# Patient Record
Sex: MALE | Race: WHITE | Employment: OTHER | URBAN - METROPOLITAN AREA
[De-identification: names, ages, dates, MRNs, and addresses within clinical notes are randomized per-mention and may not be internally consistent; named-entity substitution may affect disease eponyms.]

---

## 2017-01-19 ENCOUNTER — TELEPHONE (OUTPATIENT)
Dept: PULMONOLOGY | Age: 77
End: 2017-01-19

## 2017-01-19 NOTE — TELEPHONE ENCOUNTER
Cardiac Rehab called patient who has declined our services due to living in [place], [place] - states it to too far of a drive.     Thank you,  Wilbert Brizuela

## 2017-01-30 ENCOUNTER — OFFICE VISIT (OUTPATIENT)
Dept: CARDIOLOGY CLINIC | Age: 77
End: 2017-01-30

## 2017-01-30 VITALS
DIASTOLIC BLOOD PRESSURE: 72 MMHG | HEIGHT: 68 IN | BODY MASS INDEX: 25.01 KG/M2 | WEIGHT: 165 LBS | OXYGEN SATURATION: 98 % | HEART RATE: 78 BPM | SYSTOLIC BLOOD PRESSURE: 122 MMHG

## 2017-01-30 DIAGNOSIS — R73.03 PRE-DIABETES: ICD-10-CM

## 2017-01-30 DIAGNOSIS — I25.10 CORONARY ARTERY DISEASE INVOLVING NATIVE CORONARY ARTERY OF NATIVE HEART WITHOUT ANGINA PECTORIS: Primary | ICD-10-CM

## 2017-01-30 DIAGNOSIS — E78.5 DYSLIPIDEMIA: ICD-10-CM

## 2017-01-30 DIAGNOSIS — R07.9 CHEST PAIN IN ADULT: ICD-10-CM

## 2017-01-30 DIAGNOSIS — I10 ESSENTIAL HYPERTENSION: ICD-10-CM

## 2017-01-30 RX ORDER — AMPICILLIN TRIHYDRATE 250 MG
600 CAPSULE ORAL DAILY
COMMUNITY

## 2017-01-30 RX ORDER — METOPROLOL SUCCINATE 50 MG/1
50 TABLET, EXTENDED RELEASE ORAL DAILY
Qty: 90 TAB | Refills: 2 | Status: SHIPPED | OUTPATIENT
Start: 2017-01-30 | End: 2018-02-27 | Stop reason: SDUPTHER

## 2017-01-30 RX ORDER — NITROGLYCERIN 0.4 MG/1
0.4 TABLET SUBLINGUAL
Qty: 25 TAB | Refills: 3 | Status: SHIPPED | OUTPATIENT
Start: 2017-01-30

## 2017-01-30 NOTE — PROGRESS NOTES
1. Have you been to the ER, urgent care clinic since your last visit? Hospitalized since your last visit? Yes, [address] 12/29/16    2. Have you seen or consulted any other health care providers outside of the [address] [place] [address] since your last visit? Include any pap smears or colon screening.  No

## 2017-01-30 NOTE — PROGRESS NOTES
Donna Solo presents today for post hospital follow-up. He is a 81-year-old male with known CAD (status post 4 stents at [facility] about 11 years ago), hypertension, dyslipidemia, prediabetes. He has been lost to cardiology follow-up since stents were put in 11 years ago but he states he has been following up with his primary care physician. He presented to the hospital with complaints of exertional chest pain that had been occurring for several months prior to presentation and had worsened a few days prior to presenting to the ER. At that point, he was experiencing chest pain with minimal exertion. He works as a  for [employer].    His initial troponin was 0.20 and it peaked to 0.49. His hemoglobin A1c was 5.9. His chest x-ray was clear and showed no cardiopulmonary process. He underwent cardiac catheterization on 12/28/2016. His EF was estimated to be 55%. The LAD was noted to have an ostial 30%, distal segment of the proximal stent had a 90% in-stent restenosis, and diffuse native 30-40% disease distally. The right coronary artery had a long proximal diffuse 90% with proximal stent segment in-stent restenosis 95%. He then underwent successful stenting of the long-arm right coronary artery 90-95% to 0% as well as focal LAD in-stent restenosis 90% to 0%. He was discharged home on ACE inhibitor, beta-blocker, aspirin, statin, and Brilinta. He declined cardiac rehab as he lives in [place], [place] and it was too far to drive back and forth. He denies chest pain, tightness, heaviness, and palpitations. He denies shortness of breath at rest, dyspnea on exertion, orthopnea and PND. He denies abdominal bloating. He denies lightheadedness, admits to dizziness with sudden position changes (not new), and denies syncope. He denies lower extremity edema and claudication. Denies nausea, vomiting, diarrhea, fever, chills.        PMH:  Past Medical History Diagnosis Date    CAD (coronary artery disease)     Essential hypertension     Hyperlipidemia          PSH:  Past Surgical History   Procedure Laterality Date    Cardiac catheterization  12/28/2016          Coronary artery angiogram  12/28/2016          Lv angiography  12/28/2016          Coronary stent single w/ptca  12/28/2016          Coronary stent ea addl vessel  12/28/2016             MEDS:  Current Outpatient Prescriptions   Medication Sig    red yeast rice extract 600 mg cap Take 600 mg by mouth now.  UBIDECARENONE/VITAMIN E MIXED (COQ10  PO) Take  by mouth.  metoprolol succinate (TOPROL-XL) 50 mg XL tablet Take 1 Tab by mouth daily.  nitroglycerin (NITROSTAT) 0.4 mg SL tablet 1 Tab by SubLINGual route every five (5) minutes as needed for Chest Pain.  atorvastatin (LIPITOR) 10 mg tablet Take 1 Tab by mouth nightly.  ticagrelor (BRILINTA) 90 mg tablet Take 1 Tab by mouth two (2) times a day.  ramipril (ALTACE) 10 mg capsule Take 10 mg by mouth daily.  aspirin 81 mg chewable tablet Take 81 mg by mouth daily. No current facility-administered medications for this visit. Allergies and Sensitivities:  No Known Allergies    Family History:  No family history on file. Social History:  He  reports that he has never smoked. He does not have any smokeless tobacco history on file. He  reports that he does not drink alcohol. Physical:  Visit Vitals    /72    Pulse 78    Ht 5' 8\" (1.727 m)    Wt 74.8 kg (165 lb)    SpO2 98%    BMI 25.09 kg/m2         Exam:  Neck:  Supple, no JVD, no carotid bruits  CV:  Normal S1 and  S2, no murmurs, rubs, or gallops noted  Lungs:  Clear to ausculation throughout, no wheezes or rales  Abd:  Soft, non-tender, non-distended with good bowel sounds. No hepatosplenomegaly  Extremities:  No edema      Data:  EKG:   Normal sinus rhythm, rate 78.  Complete left bundle branch block with some secondary T-wave changes. LABS:  Lab Results   Component Value Date/Time    Sodium 139 12/29/2016 05:10 AM    Potassium 3.7 12/29/2016 05:10 AM    Chloride 103 12/29/2016 05:10 AM    CO2 25 12/29/2016 05:10 AM    Glucose 111 12/29/2016 05:10 AM    BUN 17 12/29/2016 05:10 AM    Creatinine 1.18 12/29/2016 05:10 AM     Lab Results   Component Value Date/Time    Cholesterol, total 178 12/29/2016 05:10 AM    HDL Cholesterol 34 12/29/2016 05:10 AM    LDL, calculated 96.8 12/29/2016 05:10 AM    Triglyceride 236 12/29/2016 05:10 AM    CHOL/HDL Ratio 5.2 12/29/2016 05:10 AM     Lab Results   Component Value Date/Time    ALT 22 12/27/2016 02:38 PM         Impression / Plan:  1.  CAD, s/p stents ~ 11 years ago and s/p successful PCI/stents to long RCA PXS40-11% and focal LAD ISR 90%  2. Hypertension, blood pressure well-controlled  3. Pre-diabetes  4. Dyslipidemia, on atorvastatin 10mg      Mr. Liza Britton was seen today for a post-hospital follow-up. He is status post successful stenting of a long right coronary artery 90-95% in-stent restenosis as well as focal LAD in-stent restenosis 90%. Mr. Liza Britton states that he had 4 stents put in about 11 years ago at [facility] but has been lost to cardiology follow-up. However, he states that he has been following up routinely with his primary care physician. He reports that he has had stress test done since the stents were put in many years ago but we currently do not have any records of that. Since being discharged home from the hospital, he states that he has been feeling well. He denies any further episodes of exertional chest pain. He does have some mild occasional dyspnea on exertion. He states that he has had some dizziness with position changes but this has been occurring for many years and generally the dizziness only lasts a few seconds. He states that he is compliant with his medication regimen.   His blood pressure and heart rate are well controlled, his lungs are clear, and he does not exhibit any signs of volume overload at this time. No changes were made to his medication today. Medication teaching was done with him. Importance of taking Brilinta and aspirin as prescribed discussed with him. He was given a new Brilinta savings card as he states that the one that was given to him at the hospital was declined. However, he admits that he did not activate the card. He states that in the past, when he was taking a statin, he had difficulties with taking it as he would experience muscle cramping but so far since being started on the atorvastatin; he has not had any problems. He is also taking co-Q10 . Patient education material regarding CAD and heart healthy eating attached his after visit summary. Greater than 50% of a 35 minute visit was spent counseling and answering questions. Importance of routine cardiology follow-up also discussed with him and he states that he is willing to drive out here for his follow-up appointments. He has been scheduled to follow-up with Dr. Merna Rubin as scheduled and as needed.   He was instructed to call the office if he has any cardiac problems or complaints or if he has any problems with obtaining the Brilinta due to cost.      Josephine Leyva MSN, FNP-BC

## 2017-01-30 NOTE — MR AVS SNAPSHOT
Visit Information Date & Time Provider Department Dept. Phone Encounter #  
 1/30/2017  9:00 AM Ashlie Sood NP [facility] [address] [id number] Your Appointments 4/18/2017  3:40 PM  
POST HOSPITAL with Alaina Krishnan MD  
[facility] 1 ([facility]) Appt Note: PH follow-up [facility] [id number] [address] [address] [phone] [facility] [address] Upcoming Health Maintenance Date Due DTaP/Tdap/Td series (1 - Tdap) 10/16/1962 ZOSTER VACCINE AGE 60> 10/16/2001 GLAUCOMA SCREENING Q2Y 10/16/2006 Pneumococcal 65+ Low/Medium Risk (1 of 2 - PCV13) 10/16/2006 MEDICARE YEARLY EXAM 10/16/2006 INFLUENZA AGE 9 TO ADULT 8/1/2016 Allergies as of 1/30/2017  Review Complete On: 1/30/2017 By: Ashlie Sood NP No Known Allergies Current Immunizations  Never Reviewed No immunizations on file. Not reviewed this visit You Were Diagnosed With   
  
 Codes Comments Coronary artery disease involving native coronary artery of native heart without angina pectoris    -  Primary ICD-10-CM: I25.10 ICD-9-CM: 414.01 Chest pain in adult     ICD-10-CM: R07.9 ICD-9-CM: 786.50 Essential hypertension     ICD-10-CM: I10 
ICD-9-CM: 401.9 Dyslipidemia     ICD-10-CM: E78.5 ICD-9-CM: 272.4 Pre-diabetes     ICD-10-CM: R73.03 
ICD-9-CM: 790.29 Vitals BP Pulse Height(growth percentile) Weight(growth percentile) SpO2 BMI  
 122/72 78 5' 8\" (1.727 m) 165 lb (74.8 kg) 98% 25.09 kg/m2 Smoking Status Never Smoker BMI and BSA Data Body Mass Index Body Surface Area 25.09 kg/m 2 1.89 m 2 Preferred Pharmacy Pharmacy Name Phone [facility] [place] [facility] [place] [phone] Your Updated Medication List  
  
   
 This list is accurate as of: 17  9:27 AM.  Always use your most recent med list.  
  
  
  
  
 aspirin 81 mg chewable tablet Take 81 mg by mouth daily. atorvastatin 10 mg tablet Commonly known as:  LIPITOR Take 1 Tab by mouth nightly. COQ10  PO Take  by mouth.  
  
 metoprolol succinate 50 mg XL tablet Commonly known as:  TOPROL-XL Take 1 Tab by mouth daily. nitroglycerin 0.4 mg SL tablet Commonly known as:  NITROSTAT  
1 Tab by SubLINGual route every five (5) minutes as needed for Chest Pain. ramipril 10 mg capsule Commonly known as:  ALTACE Take 10 mg by mouth daily. red yeast rice extract 600 mg Cap Take 600 mg by mouth now. ticagrelor 90 mg tablet Commonly known as:  [place] Take 1 Tab by mouth two (2) times a day. Prescriptions Sent to Pharmacy Refills  
 metoprolol succinate (TOPROL-XL) 50 mg XL tablet 2 Sig: Take 1 Tab by mouth daily. Class: Normal  
 Pharmacy: [address] Ph #: [phone] Route: Oral  
 nitroglycerin (NITROSTAT) 0.4 mg SL tablet 3 Si Tab by SubLINGual route every five (5) minutes as needed for Chest Pain. Class: Normal  
 Pharmacy: [address] Ph #: [phone] Route: SubLINGual  
  
We Performed the Following AMB POC EKG ROUTINE W/ 12 LEADS, INTER & REP [52827 CPT(R)] Patient Instructions Continue present medication regimen Follow-up with Dr. Praveena Son as scheduled and as needed Heart healthy diet, low sodium diet 2000mg per day Learning About Coronary Artery Disease (CAD) What is coronary artery disease? Coronary artery disease (CAD) occurs when plaque builds up in the arteries that bring oxygen-rich blood to your heart. Plaque is a fatty substance made of cholesterol, calcium, and other substances in the blood.  This process is called hardening of the arteries, or atherosclerosis. What happens when you have coronary artery disease? · Plaque may narrow the coronary arteries. Narrowed arteries cause poor blood flow. This can lead to angina symptoms such as chest pain or discomfort. If blood flow is completely blocked, you could have a heart attack. · You can slow CAD and reduce the risk of future problems by making changes in your lifestyle. These include quitting smoking and eating heart-healthy foods. · Treatments for CAD, along with changes in your lifestyle, can help you live a longer and healthier life. How can you prevent coronary artery disease? · Do not smoke. It may be the best thing you can do to prevent heart disease. If you need help quitting, talk to your doctor about stop-smoking programs and medicines. These can increase your chances of quitting for good. · Be active. Get at least 30 minutes of exercise on most days of the week. Walking is a good choice. You also may want to do other activities, such as running, swimming, cycling, or playing tennis or team sports. · Eat heart-healthy foods. Eat more fruits and vegetables and less foods that contain saturated and trans fats. Limit alcohol, sodium, and sweets. · Stay at a healthy weight. Lose weight if you need to. · Manage other health problems such as diabetes, high blood pressure, and high cholesterol. · Manage stress. Stress can hurt your heart. To keep stress low, talk about your problems and feelings. Don't keep your feelings hidden. · If you have talked about it with your doctor, take a low-dose aspirin every day. Aspirin can help certain people lower their risk of a heart attack or stroke. But taking aspirin isn't right for everyone, because it can cause serious bleeding. Do not start taking daily aspirin unless your doctor knows about it. How is coronary artery disease treated? · Your doctor will suggest that you make lifestyle changes. For example, your doctor may ask you to eat healthy foods, quit smoking, lose extra weight, and be more active. · You will have to take medicines. · Your doctor may suggest a procedure to open narrowed or blocked arteries. This is called angioplasty. Or your doctor may suggest using healthy blood vessels to create detours around narrowed or blocked arteries. This is called bypass surgery. Follow-up care is a key part of your treatment and safety. Be sure to make and go to all appointments, and call your doctor if you are having problems. It's also a good idea to know your test results and keep a list of the medicines you take. Where can you learn more? Go to http://vi-edwar.info/. Enter (44) [phone] in the search box to learn more about \"Learning About Coronary Artery Disease (CAD). \" Current as of: January 27, 2016 Content Version: 11.1 © 7659-7178 [employer]. Care instructions adapted under license by [employer] (which disclaims liability or warranty for this information). If you have questions about a medical condition or this instruction, always ask your healthcare professional. [address] any warranty or liability for your use of this information. Heart-Healthy Diet: Care Instructions Your Care Instructions A heart-healthy diet has lots of vegetables, fruits, nuts, beans, and whole grains, and is low in salt. It limits foods that are high in saturated fat, such as meats, cheeses, and fried foods. It may be hard to change your diet, but even small changes can lower your risk of heart attack and heart disease. Follow-up care is a key part of your treatment and safety. Be sure to make and go to all appointments, and call your doctor if you are having problems. It's also a good idea to know your test results and keep a list of the medicines you take. How can you care for yourself at home? Watch your portions · Learn what a serving is. A \"serving\" and a \"portion\" are not always the same thing. Make sure that you are not eating larger portions than are recommended. For example, a serving of pasta is ½ cup. A serving size of meat is 2 to 3 ounces. A 3-ounce serving is about the size of a deck of cards. Measure serving sizes until you are good at [place]" them. Keep in mind that restaurants often serve portions that are 2 or 3 times the size of one serving. · To keep your energy level up and keep you from feeling hungry, eat often but in smaller portions. · Eat only the number of calories you need to stay at a healthy weight. If you need to lose weight, eat fewer calories than your body burns (through exercise and other physical activity). Eat more fruits and vegetables · Eat a variety of fruit and vegetables every day. Dark green, deep orange, red, or yellow fruits and vegetables are especially good for you. Examples include spinach, carrots, peaches, and berries. · Keep carrots, celery, and other veggies handy for snacks. Buy fruit that is in season and store it where you can see it so that you will be tempted to eat it. · Cook dishes that have a lot of veggies in them, such as stir-fries and soups. Limit saturated and trans fat · Read food labels, and try to avoid saturated and trans fats. They increase your risk of heart disease. Trans fat is found in many processed foods such as cookies and crackers. · Use olive or canola oil when you cook. Try cholesterol-lowering spreads, such as Benecol or Take Control. · Bake, broil, grill, or steam foods instead of frying them. · Choose lean meats instead of high-fat meats such as hot dogs and sausages. Cut off all visible fat when you prepare meat. · Eat fish, skinless poultry, and meat alternatives such as soy products instead of high-fat meats.  Soy products, such as tofu, may be especially good for your heart. · Choose low-fat or fat-free milk and dairy products. Eat fish · Eat at least two servings of fish a week. Certain fish, such as salmon and tuna, contain omega-3 fatty acids, which may help reduce your risk of heart attack. Eat foods high in fiber · Eat a variety of grain products every day. Include whole-grain foods that have lots of fiber and nutrients. Examples of whole-grain foods include oats, whole wheat bread, and brown rice. · Buy whole-grain breads and cereals, instead of white bread or pastries. Limit salt and sodium · Limit how much salt and sodium you eat to help lower your blood pressure. · Taste food before you salt it. Add only a little salt when you think you need it. With time, your taste buds will adjust to less salt. · Eat fewer snack items, fast foods, and other high-salt, processed foods. Check food labels for the amount of sodium in packaged foods. · Choose low-sodium versions of canned goods (such as soups, vegetables, and beans). Limit sugar · Limit drinks and foods with added sugar. These include candy, desserts, and soda pop. Limit alcohol · Limit alcohol to no more than 2 drinks a day for men and 1 drink a day for women. Too much alcohol can cause health problems. When should you call for help? Watch closely for changes in your health, and be sure to contact your doctor if: 
· You would like help planning heart-healthy meals. Where can you learn more? Go to http://vi-edwar.info/. Enter V137 in the search box to learn more about \"Heart-Healthy Diet: Care Instructions. \" Current as of: January 27, 2016 Content Version: 11.1 © 1389-0670 [employer]. Care instructions adapted under license by [employer] (which disclaims liability or warranty for this information).  If you have questions about a medical condition or this instruction, always ask your healthcare professional. [employer] disclaims any warranty or liability for your use of this information. Introducing [facility]! [facility] introduces Scaleogy patient portal. Now you can access parts of your medical record, email your doctor's office, and request medication refills online. 1. In your internet browser, go to [email or web address] 2. Click on the First Time User? Click Here link in the Sign In box. You will see the New Member Sign Up page. 3. Enter your Scaleogy Access Code exactly as it appears below. You will not need to use this code after youve completed the sign-up process. If you do not sign up before the expiration date, you must request a new code. · Scaleogy Access Code: [email or web address] Expires: 3/27/2017  3:29 PM 
 
4. Enter the last four digits of your Social Security Number (xxxx) and Date of Birth (mm/dd/yyyy) as indicated and click Submit. You will be taken to the next sign-up page. 5. Create a Scaleogy ID. This will be your Scaleogy login ID and cannot be changed, so think of one that is secure and easy to remember. 6. Create a Scaleogy password. You can change your password at any time. 7. Enter your Password Reset Question and Answer. This can be used at a later time if you forget your password. 8. Enter your e-mail address. You will receive e-mail notification when new information is available in [address] 9. Click Sign Up. You can now view and download portions of your medical record. 10. Click the Download Summary menu link to download a portable copy of your medical information. If you have questions, please visit the Frequently Asked Questions section of the Scaleogy website. Remember, Scaleogy is NOT to be used for urgent needs. For medical emergencies, dial 911. Now available from your iPhone and Android! Please provide this summary of care documentation to your next provider. Your primary care clinician is listed as Luis E Rodriguez. If you have any questions after today's visit, please call [phone].

## 2017-01-30 NOTE — PATIENT INSTRUCTIONS
Continue present medication regimen  Follow-up with Dr. Freddie Rabago as scheduled and as needed  Heart healthy diet, low sodium diet 2000mg per day       Learning About Coronary Artery Disease (CAD)  What is coronary artery disease? Coronary artery disease (CAD) occurs when plaque builds up in the arteries that bring oxygen-rich blood to your heart. Plaque is a fatty substance made of cholesterol, calcium, and other substances in the blood. This process is called hardening of the arteries, or atherosclerosis. What happens when you have coronary artery disease? · Plaque may narrow the coronary arteries. Narrowed arteries cause poor blood flow. This can lead to angina symptoms such as chest pain or discomfort. If blood flow is completely blocked, you could have a heart attack. · You can slow CAD and reduce the risk of future problems by making changes in your lifestyle. These include quitting smoking and eating heart-healthy foods. · Treatments for CAD, along with changes in your lifestyle, can help you live a longer and healthier life. How can you prevent coronary artery disease? · Do not smoke. It may be the best thing you can do to prevent heart disease. If you need help quitting, talk to your doctor about stop-smoking programs and medicines. These can increase your chances of quitting for good. · Be active. Get at least 30 minutes of exercise on most days of the week. Walking is a good choice. You also may want to do other activities, such as running, swimming, cycling, or playing tennis or team sports. · Eat heart-healthy foods. Eat more fruits and vegetables and less foods that contain saturated and trans fats. Limit alcohol, sodium, and sweets. · Stay at a healthy weight. Lose weight if you need to. · Manage other health problems such as diabetes, high blood pressure, and high cholesterol. · Manage stress. Stress can hurt your heart. To keep stress low, talk about your problems and feelings.  Don't keep your feelings hidden. · If you have talked about it with your doctor, take a low-dose aspirin every day. Aspirin can help certain people lower their risk of a heart attack or stroke. But taking aspirin isn't right for everyone, because it can cause serious bleeding. Do not start taking daily aspirin unless your doctor knows about it. How is coronary artery disease treated? · Your doctor will suggest that you make lifestyle changes. For example, your doctor may ask you to eat healthy foods, quit smoking, lose extra weight, and be more active. · You will have to take medicines. · Your doctor may suggest a procedure to open narrowed or blocked arteries. This is called angioplasty. Or your doctor may suggest using healthy blood vessels to create detours around narrowed or blocked arteries. This is called bypass surgery. Follow-up care is a key part of your treatment and safety. Be sure to make and go to all appointments, and call your doctor if you are having problems. It's also a good idea to know your test results and keep a list of the medicines you take. Where can you learn more? Go to http://vi-edwar.info/. Enter [phone] [phone] in the search box to learn more about \"Learning About Coronary Artery Disease (CAD). \"  Current as of: January 27, 2016  Content Version: 11.1  © 2348-9338 [employer]. Care instructions adapted under license by [employer] (which disclaims liability or warranty for this information). If you have questions about a medical condition or this instruction, always ask your healthcare professional. [place] [address] any warranty or liability for your use of this information. Heart-Healthy Diet: Care Instructions  Your Care Instructions    A heart-healthy diet has lots of vegetables, fruits, nuts, beans, and whole grains, and is low in salt. It limits foods that are high in saturated fat, such as meats, cheeses, and fried foods.  It may be hard to change your diet, but even small changes can lower your risk of heart attack and heart disease. Follow-up care is a key part of your treatment and safety. Be sure to make and go to all appointments, and call your doctor if you are having problems. It's also a good idea to know your test results and keep a list of the medicines you take. How can you care for yourself at home? Watch your portions  · Learn what a serving is. A \"serving\" and a \"portion\" are not always the same thing. Make sure that you are not eating larger portions than are recommended. For example, a serving of pasta is ½ cup. A serving size of meat is 2 to 3 ounces. A 3-ounce serving is about the size of a deck of cards. Measure serving sizes until you are good at [place]" them. Keep in mind that restaurants often serve portions that are 2 or 3 times the size of one serving. · To keep your energy level up and keep you from feeling hungry, eat often but in smaller portions. · Eat only the number of calories you need to stay at a healthy weight. If you need to lose weight, eat fewer calories than your body burns (through exercise and other physical activity). Eat more fruits and vegetables  · Eat a variety of fruit and vegetables every day. Dark green, deep orange, red, or yellow fruits and vegetables are especially good for you. Examples include spinach, carrots, peaches, and berries. · Keep carrots, celery, and other veggies handy for snacks. Buy fruit that is in season and store it where you can see it so that you will be tempted to eat it. · Cook dishes that have a lot of veggies in them, such as stir-fries and soups. Limit saturated and trans fat  · Read food labels, and try to avoid saturated and trans fats. They increase your risk of heart disease. Trans fat is found in many processed foods such as cookies and crackers. · Use olive or canola oil when you cook.  Try cholesterol-lowering spreads, such as Benecol or Take Control. · Bake, broil, grill, or steam foods instead of frying them. · Choose lean meats instead of high-fat meats such as hot dogs and sausages. Cut off all visible fat when you prepare meat. · Eat fish, skinless poultry, and meat alternatives such as soy products instead of high-fat meats. Soy products, such as tofu, may be especially good for your heart. · Choose low-fat or fat-free milk and dairy products. Eat fish  · Eat at least two servings of fish a week. Certain fish, such as salmon and tuna, contain omega-3 fatty acids, which may help reduce your risk of heart attack. Eat foods high in fiber  · Eat a variety of grain products every day. Include whole-grain foods that have lots of fiber and nutrients. Examples of whole-grain foods include oats, whole wheat bread, and brown rice. · Buy whole-grain breads and cereals, instead of white bread or pastries. Limit salt and sodium  · Limit how much salt and sodium you eat to help lower your blood pressure. · Taste food before you salt it. Add only a little salt when you think you need it. With time, your taste buds will adjust to less salt. · Eat fewer snack items, fast foods, and other high-salt, processed foods. Check food labels for the amount of sodium in packaged foods. · Choose low-sodium versions of canned goods (such as soups, vegetables, and beans). Limit sugar  · Limit drinks and foods with added sugar. These include candy, desserts, and soda pop. Limit alcohol  · Limit alcohol to no more than 2 drinks a day for men and 1 drink a day for women. Too much alcohol can cause health problems. When should you call for help? Watch closely for changes in your health, and be sure to contact your doctor if:  · You would like help planning heart-healthy meals. Where can you learn more? Go to http://vi-edwar.info/. Enter V137 in the search box to learn more about \"Heart-Healthy Diet: Care Instructions. \"  Current as of: January 27, 2016  Content Version: 11.1  © 8654-2874 [employer]. Care instructions adapted under license by [employer] (which disclaims liability or warranty for this information). If you have questions about a medical condition or this instruction, always ask your healthcare professional. [place] [address] any warranty or liability for your use of this information.

## 2017-04-18 ENCOUNTER — OFFICE VISIT (OUTPATIENT)
Dept: CARDIOLOGY CLINIC | Age: 77
End: 2017-04-18

## 2017-04-18 VITALS
SYSTOLIC BLOOD PRESSURE: 140 MMHG | HEART RATE: 69 BPM | OXYGEN SATURATION: 98 % | HEIGHT: 68 IN | DIASTOLIC BLOOD PRESSURE: 84 MMHG | BODY MASS INDEX: 26.22 KG/M2 | WEIGHT: 173 LBS

## 2017-04-18 DIAGNOSIS — E78.5 DYSLIPIDEMIA: ICD-10-CM

## 2017-04-18 DIAGNOSIS — I25.10 CORONARY ARTERY DISEASE INVOLVING NATIVE CORONARY ARTERY OF NATIVE HEART WITHOUT ANGINA PECTORIS: Primary | ICD-10-CM

## 2017-04-18 DIAGNOSIS — I24.9 ACS (ACUTE CORONARY SYNDROME) (HCC): ICD-10-CM

## 2017-04-18 DIAGNOSIS — I10 ESSENTIAL HYPERTENSION: ICD-10-CM

## 2017-04-18 RX ORDER — CLOPIDOGREL BISULFATE 75 MG/1
75 TABLET ORAL DAILY
Qty: 30 TAB | Refills: 6 | Status: SHIPPED | OUTPATIENT
Start: 2017-04-18 | End: 2017-12-19 | Stop reason: SDUPTHER

## 2017-04-18 NOTE — MR AVS SNAPSHOT
Visit Information Date & Time Provider Department Dept. Phone Encounter #  
 4/18/2017  2:00 PM Bárbara Dewey MD [facility] [address] [id number] Upcoming Health Maintenance Date Due DTaP/Tdap/Td series (1 - Tdap) 10/16/1962 ZOSTER VACCINE AGE 60> 10/16/2001 GLAUCOMA SCREENING Q2Y 10/16/2006 Pneumococcal 65+ Low/Medium Risk (1 of 2 - PCV13) 10/16/2006 MEDICARE YEARLY EXAM 10/16/2006 INFLUENZA AGE 9 TO ADULT 8/1/2016 Allergies as of 4/18/2017  Review Complete On: 1/30/2017 By: Sarah Webster NP No Known Allergies Current Immunizations  Never Reviewed No immunizations on file. Not reviewed this visit You Were Diagnosed With   
  
 Codes Comments [facility]    -  Primary ICD-10-CM: I24.9 ICD-9-CM: 411.1 Vitals BP Pulse Height(growth percentile) Weight(growth percentile) SpO2 BMI  
 140/84 69 5' 8\" (1.727 m) 173 lb (78.5 kg) 98% 26.3 kg/m2 Smoking Status Never Smoker Vitals History BMI and BSA Data Body Mass Index Body Surface Area  
 26.3 kg/m 2 1.94 m 2 Preferred Pharmacy Pharmacy Name Phone [facility] [address] [facility] [phone] Your Updated Medication List  
  
   
This list is accurate as of: 4/18/17  2:20 PM.  Always use your most recent med list.  
  
  
  
  
 aspirin 81 mg chewable tablet Take 81 mg by mouth daily. atorvastatin 10 mg tablet Commonly known as:  LIPITOR Take 1 Tab by mouth nightly. COQ10  PO Take  by mouth.  
  
 metoprolol succinate 50 mg XL tablet Commonly known as:  TOPROL-XL Take 1 Tab by mouth daily. nitroglycerin 0.4 mg SL tablet Commonly known as:  NITROSTAT  
1 Tab by SubLINGual route every five (5) minutes as needed for Chest Pain. ramipril 10 mg capsule Commonly known as:  ALTACE Take 10 mg by mouth daily. red yeast rice extract 600 mg Cap Take 600 mg by mouth now. ticagrelor 90 mg tablet Commonly known as:  [employer] Take 1 Tab by mouth two (2) times a day. We Performed the Following AMB POC EKG ROUTINE W/ 12 LEADS, INTER & REP [04339 CPT(R)] Patient Instructions Stop Brilinta Start Plavix 75 mg one tab daily Introducing [facility]! [facility] introduces Face++ patient portal. Now you can access parts of your medical record, email your doctor's office, and request medication refills online. 1. In your internet browser, go to [email or web address] 2. Click on the First Time User? Click Here link in the Sign In box. You will see the New Member Sign Up page. 3. Enter your Face++ Access Code exactly as it appears below. You will not need to use this code after youve completed the sign-up process. If you do not sign up before the expiration date, you must request a new code. · Face++ Access Code: [email or web address] Expires: 7/17/2017  1:49 PM 
 
4. Enter the last four digits of your Social Security Number (xxxx) and Date of Birth (mm/dd/yyyy) as indicated and click Submit. You will be taken to the next sign-up page. 5. Create a Face++ ID. This will be your Face++ login ID and cannot be changed, so think of one that is secure and easy to remember. 6. Create a Face++ password. You can change your password at any time. 7. Enter your Password Reset Question and Answer. This can be used at a later time if you forget your password. 8. Enter your e-mail address. You will receive e-mail notification when new information is available in [address] 9. Click Sign Up. You can now view and download portions of your medical record. 10. Click the Download Summary menu link to download a portable copy of your medical information.  
 
If you have questions, please visit the Frequently Asked Questions section of the [employer]. Remember, [employer] is NOT to be used for urgent needs. For medical emergencies, dial 911. Now available from your iPhone and Android! Please provide this summary of care documentation to your next provider. Your primary care clinician is listed as Ephraim Rodriguez. If you have any questions after today's visit, please call [phone].

## 2017-04-18 NOTE — PROGRESS NOTES
HISTORY OF PRESENT ILLNESS  Michelle Holloway is a 77 y.o. male. ASSESSMENT and PLAN    Mr. Michelle Holloway has history of CAD. Around 2005, he had 4 stents placed at [facility].  In December 2016, he presented to [facility] with increased exertional chest pains for several months. He was diagnosed with ACS. He subsequently underwent angioplasty and stent of proximal RCA 90% ISR residual 0% using DERIAN. He also had focal LAD 90% I SR stented to 0%. He had been on Brilinta. However, this is switched to Plavix as he has had frequent epistaxis. He has also noticed some blood on the toilet paper. I will defer further evaluation of rectal blood to the PCP. I did switch his Brilinta to Plavix today. Hopefully, this will improve his nosebleeds. From cardiac standpoint, otherwise, he has been doing well. He remains quite active physically. He still feels that he is little more fatigued than his baseline. He denies any recurrent chest pains. He denies active tobacco use. He remains on Lipitor 10 mg daily. His weight today is 173 pounds. I will see him back in 6 months. Thank you. Encounter Diagnoses   Name Primary?  Coronary artery disease involving native coronary artery of native heart without angina pectoris Yes    [facility] (acute [facility] syndrome) ([facility]     Essential hypertension     Dyslipidemia      current treatment plan is effective, no change in therapy  lab results and schedule of future lab studies reviewed with patient  reviewed diet, exercise and weight control  cardiovascular risk and specific lipid/LDL goals reviewed  use of aspirin to prevent MI and TIA's discussed      HPI   Today, Mr. José Manuel Eubanks has no complaints of chest pains, increased shortness of breath or decreased exercise capacity. However, he has noted increased fatigue since his hospitalization in December 2016. He has not had any recurrent exertional chest pains.   He has noted increased nosebleeds and blood on the toilet paper. Therefore, he has been cutting his Brilinta tablets in half for the last 1 week. Review of Systems   Constitutional: Positive for malaise/fatigue. Respiratory: Positive for shortness of breath. Cardiovascular: Negative for chest pain, palpitations, orthopnea, claudication, leg swelling and PND. All other systems reviewed and are negative. Physical Exam   Constitutional: He is oriented to person, place, and time. He appears well-developed and well-nourished. HENT:   Head: Normocephalic. Eyes: Conjunctivae are normal.   Neck: Neck supple. No JVD present. Carotid bruit is not present. No thyromegaly present. Cardiovascular: Normal rate and regular rhythm. Pulmonary/Chest: Breath sounds normal.   Abdominal: Bowel sounds are normal.   Musculoskeletal: He exhibits no edema. Neurological: He is alert and oriented to person, place, and time. Skin: Skin is warm and dry. Nursing note and vitals reviewed. PCP: Emilia Vazquez. Hima Rogers MD    Past Medical History:   Diagnosis Date    CAD (coronary artery disease)     Essential hypertension     Hyperlipidemia        Past Surgical History:   Procedure Laterality Date    CARDIAC CATHETERIZATION  12/28/2016         CORONARY ARTERY ANGIOGRAM  12/28/2016         CORONARY STENT EA ADDL VESSEL  12/28/2016         CORONARY STENT SINGLE W/PTCA  12/28/2016         HX CORONARY STENT PLACEMENT      HX HEART CATHETERIZATION      LV ANGIOGRAPHY  12/28/2016            Current Outpatient Prescriptions   Medication Sig Dispense Refill    clopidogrel (PLAVIX) 75 mg tab Take 1 Tab by mouth daily. 30 Tab 6    red yeast rice extract 600 mg cap Take 600 mg by mouth now.  UBIDECARENONE/VITAMIN E MIXED (COQ10  PO) Take  by mouth.  metoprolol succinate (TOPROL-XL) 50 mg XL tablet Take 1 Tab by mouth daily.  90 Tab 2    nitroglycerin (NITROSTAT) 0.4 mg SL tablet 1 Tab by SubLINGual route every five (5) minutes as needed for Chest Pain. 25 Tab 3    atorvastatin (LIPITOR) 10 mg tablet Take 1 Tab by mouth nightly. 30 Tab 2    ticagrelor (BRILINTA) 90 mg tablet Take 1 Tab by mouth two (2) times a day. 60 Tab 0    ramipril (ALTACE) 10 mg capsule Take 10 mg by mouth daily.  aspirin 81 mg chewable tablet Take 81 mg by mouth daily. The patient has a family history of    Social History   Substance Use Topics    Smoking status: Never Smoker    Smokeless tobacco: None    Alcohol use No       Lab Results   Component Value Date/Time    Cholesterol, total 178 12/29/2016 05:10 AM    HDL Cholesterol 34 12/29/2016 05:10 AM    LDL, calculated 96.8 12/29/2016 05:10 AM    Triglyceride 236 12/29/2016 05:10 AM    CHOL/HDL Ratio 5.2 12/29/2016 05:10 AM        BP Readings from Last 3 Encounters:   04/18/17 140/84   01/30/17 122/72   12/29/16 142/87        Pulse Readings from Last 3 Encounters:   04/18/17 69   01/30/17 78   12/29/16 87       Wt Readings from Last 3 Encounters:   04/18/17 78.5 kg (173 lb)   01/30/17 74.8 kg (165 lb)   12/27/16 74.3 kg (163 lb 12.8 oz)         EKG: unchanged from previous tracings, normal sinus rhythm, LBBB.

## 2017-04-18 NOTE — PROGRESS NOTES
1. Have you been to the ER, urgent care clinic since your last visit? Hospitalized since your last visit? Yes, 12/27/16 - 12/29/16 for ACS    2. Have you seen or consulted any other health care providers outside of the [address] [place] [address] since your last visit? Include any pap smears or colon screening.   No

## 2017-10-17 ENCOUNTER — OFFICE VISIT (OUTPATIENT)
Dept: CARDIOLOGY CLINIC | Age: 77
End: 2017-10-17

## 2017-10-17 VITALS
OXYGEN SATURATION: 98 % | HEART RATE: 72 BPM | HEIGHT: 68 IN | SYSTOLIC BLOOD PRESSURE: 128 MMHG | BODY MASS INDEX: 26.07 KG/M2 | WEIGHT: 172 LBS | DIASTOLIC BLOOD PRESSURE: 72 MMHG

## 2017-10-17 DIAGNOSIS — E78.5 DYSLIPIDEMIA: ICD-10-CM

## 2017-10-17 DIAGNOSIS — I25.10 CORONARY ARTERY DISEASE INVOLVING NATIVE CORONARY ARTERY OF NATIVE HEART WITHOUT ANGINA PECTORIS: Primary | ICD-10-CM

## 2017-10-17 DIAGNOSIS — I10 ESSENTIAL HYPERTENSION: ICD-10-CM

## 2017-10-17 RX ORDER — METFORMIN HYDROCHLORIDE 500 MG/1
250 TABLET ORAL
COMMUNITY
End: 2020-09-10

## 2017-10-17 NOTE — MR AVS SNAPSHOT
Visit Information Date & Time Provider Department Dept. Phone Encounter #  
 10/17/2017  1:00 PM Kwesi Ramirez MD [facility] [address] [id number] Your Appointments 5/8/2018  8:20 AM  
Follow Up with Kwesi Ramirez MD  
[facility] ([facility]) Appt Note: 6-9 month f/up [place] [address] [address] [address] [phone] [address] [place] [address] [address] Upcoming Health Maintenance Date Due DTaP/Tdap/Td series (1 - Tdap) 10/16/1962 ZOSTER VACCINE AGE 60> 8/16/2001 GLAUCOMA SCREENING Q2Y 10/16/2006 Pneumococcal 65+ Low/Medium Risk (1 of 2 - PCV13) 10/16/2006 MEDICARE YEARLY EXAM 10/16/2006 INFLUENZA AGE 9 TO ADULT 8/1/2017 Allergies as of 10/17/2017  Review Complete On: 4/18/2017 By: Kwesi Ramirez MD  
 No Known Allergies Current Immunizations  Never Reviewed No immunizations on file. Not reviewed this visit You Were Diagnosed With   
  
 Codes Comments Coronary artery disease involving native coronary artery of native heart without angina pectoris    -  Primary ICD-10-CM: I25.10 ICD-9-CM: 414.01 Essential hypertension     ICD-10-CM: I10 
ICD-9-CM: 401.9 Dyslipidemia     ICD-10-CM: E78.5 ICD-9-CM: 272.4 Vitals BP Pulse Height(growth percentile) Weight(growth percentile) SpO2 BMI  
 128/72 72 5' 8\" (1.727 m) 172 lb (78 kg) 98% 26.15 kg/m2 Smoking Status Never Smoker Vitals History BMI and BSA Data Body Mass Index Body Surface Area  
 26.15 kg/m 2 1.93 m 2 Preferred Pharmacy Pharmacy Name Phone [facility] [address], [address] [place] [phone] Your Updated Medication List  
  
   
This list is accurate as of: 10/17/17  1:27 PM.  Always use your most recent med list.  
  
  
  
  
 aspirin 81 mg chewable tablet Take 81 mg by mouth daily. atorvastatin 10 mg tablet Commonly known as:  LIPITOR Take 1 Tab by mouth nightly. clopidogrel 75 mg Tab Commonly known as:  PLAVIX Take 1 Tab by mouth daily. COQ10  PO Take  by mouth.  
  
 metFORMIN 500 mg tablet Commonly known as:  GLUCOPHAGE Take 250 mg by mouth daily (with breakfast). metoprolol succinate 50 mg XL tablet Commonly known as:  TOPROL-XL Take 1 Tab by mouth daily. nitroglycerin 0.4 mg SL tablet Commonly known as:  NITROSTAT  
1 Tab by SubLINGual route every five (5) minutes as needed for Chest Pain. ramipril 10 mg capsule Commonly known as:  ALTACE Take 10 mg by mouth daily. red yeast rice extract 600 mg Cap Take 600 mg by mouth now. ticagrelor 90 mg tablet Commonly known as:  [employer] Take 1 Tab by mouth two (2) times a day. We Performed the Following AMB POC EKG ROUTINE W/ 12 LEADS, INTER & REP [57231 CPT(R)] Introducing [facility]! Brittaney Smith introduces [employer] Vision patient portal. Now you can access parts of your medical record, email your doctor's office, and request medication refills online. 1. In your internet browser, go to [email or web address] 2. Click on the First Time User? Click Here link in the Sign In box. You will see the New Member Sign Up page. 3. Enter your [employer] Access Code exactly as it appears below. You will not need to use this code after youve completed the sign-up process. If you do not sign up before the expiration date, you must request a new code. · [employer] Access Code: [email or web address] Expires: 1/15/2018  1:27 PM 
 
4. Enter the last four digits of your Social Security Number (xxxx) and Date of Birth (mm/dd/yyyy) as indicated and click Submit. You will be taken to the next sign-up page. 5. Create a [employer] Vision ID.  This will be your [employer] login ID and cannot be changed, so think of one that is secure and easy to remember. 6. Create a [employer] password. You can change your password at any time. 7. Enter your Password Reset Question and Answer. This can be used at a later time if you forget your password. 8. Enter your e-mail address. You will receive e-mail notification when new information is available in [address] 9. Click Sign Up. You can now view and download portions of your medical record. 10. Click the Download Summary menu link to download a portable copy of your medical information. If you have questions, please visit the Frequently Asked Questions section of the [employer] website. Remember, [employer] is NOT to be used for urgent needs. For medical emergencies, dial 911. Now available from your iPhone and Android! Please provide this summary of care documentation to your next provider. Your primary care clinician is listed as Josefina Frausto. If you have any questions after today's visit, please call [phone].

## 2017-10-17 NOTE — PROGRESS NOTES
1. Have you been to the ER, urgent care clinic since your last visit? Hospitalized since your last visit? No     2. Have you seen or consulted any other health care providers outside of the [address] [place] [address] since your last visit? Include any pap smears or colon screening.  No

## 2017-10-17 NOTE — PROGRESS NOTES
HISTORY OF PRESENT ILLNESS  Jay Pete is a 69 y.o. male. ASSESSMENT and PLAN    Mr. Jay Pete has history of CAD. Around 2005, he had 4 stents placed at [facility].  In December 2016, he presented to [facility] with increased exertional chest pains for several months. He was diagnosed with ACS. He subsequently underwent angioplasty and stent of proximal RCA 90% ISR residual 0% using DERIAN. He also had focal LAD 90% I SR stented to 0%. He had been on Brilinta. However, this is switched to Plavix as he has had frequent epistaxis. He has also noticed some blood on the toilet paper. I will defer further evaluation of rectal blood to the PCP. I did switch his Brilinta to Plavix today. Hopefully, this will improve his nosebleeds.  CAD:   Symptomatically stable.  BP:   Well-controlled.  HR:    Stable.  CHF:   Currently, there is no evidence of decompensated CHF noted.  Weight:   His weight today is 172 pounds. It is near his baseline.  Cholesterol:   Target LDL <70. He remains on Lipitor 10 mg daily.  Tobacco:   He denies active tobacco use.  Anti-platelet:   Remains on ASA, and Plavix. I will see him back in 6 months. Thank you. Encounter Diagnoses   Name Primary?  Coronary artery disease involving native coronary artery of native heart without angina pectoris Yes    Essential hypertension     Dyslipidemia      current treatment plan is effective, no change in therapy  lab results and schedule of future lab studies reviewed with patient  reviewed diet, exercise and weight control  very strongly urged to quit smoking to reduce cardiovascular risk  cardiovascular risk and specific lipid/LDL goals reviewed  use of aspirin to prevent MI and TIA's discussed      HPI  Today, Mr. Baljinder Becerra has no complaints of chest pains, increased shortness of breath or decreased exercise capacity. He feels that he is back to his baseline.   He denies any orthopnea or PND. He denies any palpitations or dizziness. Review of Systems   Respiratory: Negative for shortness of breath. Cardiovascular: Negative for chest pain, palpitations, orthopnea, claudication, leg swelling and PND. All other systems reviewed and are negative. Physical Exam   Constitutional: He is oriented to person, place, and time. He appears well-developed and well-nourished. HENT:   Head: Normocephalic. Eyes: Conjunctivae are normal.   Neck: Neck supple. No JVD present. Carotid bruit is not present. No thyromegaly present. Cardiovascular: Normal rate and regular rhythm. No murmur heard. Pulmonary/Chest: Breath sounds normal.   Abdominal: Bowel sounds are normal.   Musculoskeletal: He exhibits no edema. Neurological: He is alert and oriented to person, place, and time. Skin: Skin is warm and dry. Nursing note and vitals reviewed. PCP: Rita Clinton MD    Past Medical History:   Diagnosis Date    CAD (coronary artery disease)     Essential hypertension     Hyperlipidemia        Past Surgical History:   Procedure Laterality Date    CARDIAC CATHETERIZATION  12/28/2016         CORONARY ARTERY ANGIOGRAM  12/28/2016         CORONARY STENT EA ADDL VESSEL  12/28/2016         CORONARY STENT SINGLE W/PTCA  12/28/2016         HX CORONARY STENT PLACEMENT      HX HEART CATHETERIZATION      LV ANGIOGRAPHY  12/28/2016            Current Outpatient Prescriptions   Medication Sig Dispense Refill    metFORMIN (GLUCOPHAGE) 500 mg tablet Take 250 mg by mouth daily (with breakfast).  clopidogrel (PLAVIX) 75 mg tab Take 1 Tab by mouth daily. 30 Tab 6    red yeast rice extract 600 mg cap Take 600 mg by mouth now.  UBIDECARENONE/VITAMIN E MIXED (COQ10  PO) Take  by mouth.  metoprolol succinate (TOPROL-XL) 50 mg XL tablet Take 1 Tab by mouth daily.  90 Tab 2    nitroglycerin (NITROSTAT) 0.4 mg SL tablet 1 Tab by SubLINGual route every five (5) minutes as needed for Chest Pain. 25 Tab 3    atorvastatin (LIPITOR) 10 mg tablet Take 1 Tab by mouth nightly. 30 Tab 2    ticagrelor (BRILINTA) 90 mg tablet Take 1 Tab by mouth two (2) times a day. 60 Tab 0    ramipril (ALTACE) 10 mg capsule Take 10 mg by mouth daily.  aspirin 81 mg chewable tablet Take 81 mg by mouth daily. The patient has a family history of    Social History   Substance Use Topics    Smoking status: Never Smoker    Smokeless tobacco: None    Alcohol use No       Lab Results   Component Value Date/Time    Cholesterol, total 178 12/29/2016 05:10 AM    HDL Cholesterol 34 12/29/2016 05:10 AM    LDL, calculated 96.8 12/29/2016 05:10 AM    Triglyceride 236 12/29/2016 05:10 AM    CHOL/HDL Ratio 5.2 12/29/2016 05:10 AM        BP Readings from Last 3 Encounters:   10/17/17 128/72   04/18/17 140/84   01/30/17 122/72        Pulse Readings from Last 3 Encounters:   10/17/17 72   04/18/17 69   01/30/17 78       Wt Readings from Last 3 Encounters:   10/17/17 78 kg (172 lb)   04/18/17 78.5 kg (173 lb)   01/30/17 74.8 kg (165 lb)         EKG: unchanged from previous tracings, normal sinus rhythm, LBBB.

## 2017-12-19 RX ORDER — CLOPIDOGREL BISULFATE 75 MG/1
TABLET ORAL
Qty: 30 TAB | Refills: 6 | Status: SHIPPED | OUTPATIENT
Start: 2017-12-19 | End: 2018-08-03 | Stop reason: SDUPTHER

## 2018-02-27 RX ORDER — METOPROLOL SUCCINATE 50 MG/1
50 TABLET, EXTENDED RELEASE ORAL DAILY
Qty: 90 TAB | Refills: 3 | Status: SHIPPED | OUTPATIENT
Start: 2018-02-27 | End: 2019-04-13 | Stop reason: SDUPTHER

## 2018-07-10 ENCOUNTER — OFFICE VISIT (OUTPATIENT)
Dept: CARDIOLOGY CLINIC | Age: 78
End: 2018-07-10

## 2018-07-10 VITALS
HEIGHT: 68 IN | WEIGHT: 171 LBS | BODY MASS INDEX: 25.91 KG/M2 | SYSTOLIC BLOOD PRESSURE: 154 MMHG | HEART RATE: 70 BPM | OXYGEN SATURATION: 96 % | DIASTOLIC BLOOD PRESSURE: 70 MMHG

## 2018-07-10 DIAGNOSIS — I25.10 CORONARY ARTERY DISEASE INVOLVING NATIVE CORONARY ARTERY OF NATIVE HEART WITHOUT ANGINA PECTORIS: Primary | ICD-10-CM

## 2018-07-10 DIAGNOSIS — E78.5 DYSLIPIDEMIA: ICD-10-CM

## 2018-07-10 DIAGNOSIS — I10 ESSENTIAL HYPERTENSION: ICD-10-CM

## 2018-07-10 NOTE — MR AVS SNAPSHOT
[address] [place] [address] [address] [place] [address] 
[phone] Patient: Steven Olmos MRN: [id number] Visit Information Date & Time Provider Department Dept. Phone Encounter #  
 7/10/2018  2:40 PM Berna Gonsalves MD [facility] [id number] Your Appointments 7/10/2018  2:40 PM  
Follow Up with Berna Gonsalves MD  
[facility] ([address]) Appt Note: 6-9 month f/up; 6-9 month f/up. R/S due to work schedule; l/m to confirm appt on 7-10-18[facility] [id number]  
[phone] [address] Upcoming Health Maintenance Date Due DTaP/Tdap/Td series (1 - Tdap) 10/16/1962 ZOSTER VACCINE AGE 60> 2001 GLAUCOMA SCREENING Q2Y 10/16/2006 Pneumococcal 65+ Low/Medium Risk (1 of 2 - PCV13) 10/16/2006 MEDICARE YEARLY EXAM 3/14/2018 Influenza Age 5 to Adult 2018 Allergies as of 7/10/2018  Review Complete On: 10/17/2017 By: Berna Gonsalves MD  
 No Known Allergies Current Immunizations  Never Reviewed No immunizations on file. Not reviewed this visit You Were Diagnosed With   
  
 Codes Comments Coronary artery disease involving native coronary artery of native heart without angina pectoris    -  Primary ICD-10-CM: I25.10 ICD-9-CM: 414.01 Essential hypertension     ICD-10-CM: I10 
ICD-9-CM: 401.9 Dyslipidemia     ICD-10-CM: E78.5 ICD-9-CM: 272.4 Vitals BP Pulse Height(growth percentile) Weight(growth percentile) SpO2 BMI  
 154/70 70 5' 8\" (1.727 m) 171 lb (77.6 kg) 96% 26 kg/m2 Smoking Status Never Smoker Vitals History BMI and BSA Data Body Mass Index Body Surface Area  
 26 kg/m 2 1.93 m 2 Preferred Pharmacy Pharmacy Name Phone [address] [facility] [phone] Your Updated Medication List  
  
   
This list is accurate as of 7/10/18  2:39 PM.  Always use your most recent med list.  
  
  
  
  
 aspirin 81 mg chewable tablet Take 81 mg by mouth daily. atorvastatin 10 mg tablet Commonly known as:  LIPITOR Take 1 Tab by mouth nightly. clopidogrel 75 mg Tab Commonly known as:  PLAVIX TAKE ONE TABLET BY MOUTH ONCE DAILY  
  
 COQ10  PO Take  by mouth.  
  
 metFORMIN 500 mg tablet Commonly known as:  GLUCOPHAGE Take 250 mg by mouth daily (with breakfast). metoprolol succinate 50 mg XL tablet Commonly known as:  TOPROL-XL Take 1 Tab by mouth daily. nitroglycerin 0.4 mg SL tablet Commonly known as:  NITROSTAT  
1 Tab by SubLINGual route every five (5) minutes as needed for Chest Pain. ramipril 10 mg capsule Commonly known as:  ALTACE Take 10 mg by mouth daily. red yeast rice extract 600 mg Cap Take 600 mg by mouth now. We Performed the Following AMB POC EKG ROUTINE W/ 12 LEADS, INTER & REP [28480 CPT(R)] Introducing [facility]! [employer] introduces [employer] patient portal. Now you can access parts of your medical record, email your doctor's office, and request medication refills online. 1. In your internet browser, go to [email or web address] 2. Click on the First Time User? Click Here link in the Sign In box. You will see the New Member Sign Up page. 3. Enter your [employer] Access Code exactly as it appears below. You will not need to use this code after youve completed the sign-up process. If you do not sign up before the expiration date, you must request a new code. · [employer] Access Code: [email or web address] Expires: 10/8/2018  2:04 PM 
 
4.  Enter the last four digits of your Social Security Number (xxxx) and Date of Birth (mm/dd/yyyy) as indicated and click Submit. You will be taken to the next sign-up page. 5. Create a [employer] ID. This will be your [employer] login ID and cannot be changed, so think of one that is secure and easy to remember. 6. Create a [employer] password. You can change your password at any time. 7. Enter your Password Reset Question and Answer. This can be used at a later time if you forget your password. 8. Enter your e-mail address. You will receive e-mail notification when new information is available in [address] 9. Click Sign Up. You can now view and download portions of your medical record. 10. Click the Download Summary menu link to download a portable copy of your medical information. If you have questions, please visit the Frequently Asked Questions section of the [employer] website. Remember, [employer] is NOT to be used for urgent needs. For medical emergencies, dial 911. Now available from your iPhone and Android! Please provide this summary of care documentation to your next provider. Your primary care clinician is listed as Stephen Elam. If you have any questions after today's visit, please call [phone].

## 2018-07-10 NOTE — PROGRESS NOTES
1. Have you been to the ER, urgent care clinic since your last visit? Hospitalized since your last visit? No     2. Have you seen or consulted any other health care providers outside of the [address] [place] [address] since your last visit? Include any pap smears or colon screening.  No

## 2018-07-10 NOTE — PROGRESS NOTES
HISTORY OF PRESENT ILLNESS  Myra Ybarra is a 69 y.o. male. ASSESSMENT and PLAN    Mr. Myra Ybarra has history of CAD.  Around 2005, he had 4 stents placed at [facility].  In December 2016, he presented to [facility] with increased exertional chest pains for several months. [facility] was diagnosed with ACS. [facility] subsequently underwent angioplasty and stent of proximal RCA 90% ISR residual 0% using DERIAN.  He also had focal LAD 90% ISR stented to 0%. He had been on Miriam Dunker, this is switched to Plavix as he has had frequent epistaxis. [facility] has also noticed some blood on the toilet paper. Alfa Lopez will defer further evaluation of rectal blood to the PCP.  I did switch his Brilinta to Plavix today.  Hopefully, this will improve his nosebleeds.  CAD:   Symptomatically stable.  BP:   Mildly elevated but acceptable. I will continue his current medication regimen for the time being. If he remains mildly hypertensive, his Altace could be increased.  HR:    Stable.  CHF:   There is no evidence of decompensated CHF noted.  Weight:   His weight today is 171 pounds. It is at baseline.  Cholesterol:   Target LDL <70. Lipitor 10 mg daily.  Anti-platelet:   Remains on ASA, and Plavix. I will see him back in 6 months.  Thank you. Encounter Diagnoses   Name Primary?  Coronary artery disease involving native coronary artery of native heart without angina pectoris Yes    Essential hypertension     Dyslipidemia      current treatment plan is effective, no change in therapy  lab results and schedule of future lab studies reviewed with patient  reviewed diet, exercise and weight control  cardiovascular risk and specific lipid/LDL goals reviewed  use of aspirin to prevent MI and TIA's discussed      HPI  Today, Mr. Traci Curiel has no complaints of chest pains, increased shortness of breath or decreased exercise capacity. He denies any orthopnea or PND.   He denies any palpitations or dizziness. He is accompanied by his wife who is in agreement. Despite his age, he remains quite active physically. He will often do 4-5 hours worth of yard work without taking significant breaks. He has no exertional chest discomfort. He has not noted any changes in his exercise or activity levels. Review of Systems   Constitutional: Positive for malaise/fatigue. Respiratory: Negative for shortness of breath. Cardiovascular: Negative for chest pain, palpitations, orthopnea, claudication, leg swelling and PND. All other systems reviewed and are negative. Physical Exam   Constitutional: He is oriented to person, place, and time. He appears well-developed and well-nourished. HENT:   Head: Normocephalic. Eyes: Conjunctivae are normal.   Neck: Neck supple. No JVD present. Carotid bruit is not present. No thyromegaly present. Cardiovascular: Normal rate and regular rhythm. Pulmonary/Chest: Breath sounds normal.   Abdominal: Bowel sounds are normal.   Musculoskeletal: He exhibits no edema. Neurological: He is alert and oriented to person, place, and time. Skin: Skin is warm and dry. Nursing note and vitals reviewed. PCP: Horacio Cantu MD    Past Medical History:   Diagnosis Date    CAD (coronary artery disease)     Essential hypertension     Hyperlipidemia        Past Surgical History:   Procedure Laterality Date    CARDIAC CATHETERIZATION  12/28/2016         CORONARY ARTERY ANGIOGRAM  12/28/2016         CORONARY STENT EA ADDL VESSEL  12/28/2016         CORONARY STENT SINGLE W/PTCA  12/28/2016         HX CORONARY STENT PLACEMENT      HX HEART CATHETERIZATION      LV ANGIOGRAPHY  12/28/2016            Current Outpatient Prescriptions   Medication Sig Dispense Refill    metoprolol succinate (TOPROL-XL) 50 mg XL tablet Take 1 Tab by mouth daily.  90 Tab 3    clopidogrel (PLAVIX) 75 mg tab TAKE ONE TABLET BY MOUTH ONCE DAILY 30 Tab 6    metFORMIN (GLUCOPHAGE) 500 mg tablet Take 250 mg by mouth daily (with breakfast).  red yeast rice extract 600 mg cap Take 600 mg by mouth now.  UBIDECARENONE/VITAMIN E MIXED (COQ10  PO) Take  by mouth.  nitroglycerin (NITROSTAT) 0.4 mg SL tablet 1 Tab by SubLINGual route every five (5) minutes as needed for Chest Pain. 25 Tab 3    atorvastatin (LIPITOR) 10 mg tablet Take 1 Tab by mouth nightly. 30 Tab 2    ramipril (ALTACE) 10 mg capsule Take 10 mg by mouth daily.  aspirin 81 mg chewable tablet Take 81 mg by mouth daily. The patient has a family history of    Social History   Substance Use Topics    Smoking status: Never Smoker    Smokeless tobacco: None    Alcohol use No       Lab Results   Component Value Date/Time    Cholesterol, total 178 12/29/2016 05:10 AM    HDL Cholesterol 34 (L) 12/29/2016 05:10 AM    LDL, calculated 96.8 12/29/2016 05:10 AM    Triglyceride 236 (H) 12/29/2016 05:10 AM    CHOL/HDL Ratio 5.2 (H) 12/29/2016 05:10 AM        BP Readings from Last 3 Encounters:   07/10/18 154/70   10/17/17 128/72   04/18/17 140/84        Pulse Readings from Last 3 Encounters:   07/10/18 70   10/17/17 72   04/18/17 69       Wt Readings from Last 3 Encounters:   07/10/18 77.6 kg (171 lb)   10/17/17 78 kg (172 lb)   04/18/17 78.5 kg (173 lb)         EKG: unchanged from previous tracings, normal sinus rhythm, LBBB.

## 2018-08-03 RX ORDER — CLOPIDOGREL BISULFATE 75 MG/1
TABLET ORAL
Qty: 90 TAB | Refills: 3 | Status: SHIPPED | OUTPATIENT
Start: 2018-08-03 | End: 2019-10-10 | Stop reason: SDUPTHER

## 2019-04-15 RX ORDER — METOPROLOL SUCCINATE 50 MG/1
TABLET, EXTENDED RELEASE ORAL
Qty: 90 TAB | Refills: 3 | Status: SHIPPED | OUTPATIENT
Start: 2019-04-15 | End: 2019-04-15 | Stop reason: SDUPTHER

## 2019-04-16 RX ORDER — METOPROLOL SUCCINATE 50 MG/1
TABLET, EXTENDED RELEASE ORAL
Qty: 90 TAB | Refills: 3 | Status: SHIPPED | OUTPATIENT
Start: 2019-04-16 | End: 2019-05-17

## 2019-04-30 ENCOUNTER — OFFICE VISIT (OUTPATIENT)
Dept: CARDIOLOGY CLINIC | Age: 79
End: 2019-04-30

## 2019-04-30 VITALS
OXYGEN SATURATION: 99 % | HEART RATE: 90 BPM | SYSTOLIC BLOOD PRESSURE: 128 MMHG | HEIGHT: 68 IN | WEIGHT: 164 LBS | BODY MASS INDEX: 24.86 KG/M2 | DIASTOLIC BLOOD PRESSURE: 80 MMHG

## 2019-04-30 DIAGNOSIS — I25.10 CORONARY ARTERY DISEASE INVOLVING NATIVE CORONARY ARTERY OF NATIVE HEART WITHOUT ANGINA PECTORIS: Primary | ICD-10-CM

## 2019-04-30 RX ORDER — METOPROLOL SUCCINATE 50 MG/1
TABLET, EXTENDED RELEASE ORAL
Qty: 90 TAB | Refills: 3 | Status: CANCELLED | OUTPATIENT
Start: 2019-04-30

## 2019-04-30 RX ORDER — AMLODIPINE BESYLATE 5 MG/1
5 TABLET ORAL
COMMUNITY
Start: 2019-04-25 | End: 2019-04-30 | Stop reason: ALTCHOICE

## 2019-04-30 RX ORDER — LANOLIN ALCOHOL/MO/W.PET/CERES
325 CREAM (GRAM) TOPICAL
COMMUNITY
Start: 2019-04-25 | End: 2019-05-17 | Stop reason: SDUPTHER

## 2019-04-30 NOTE — PROGRESS NOTES
HISTORY OF PRESENT ILLNESS  Juan Mahajan is a 69 y.o. male. ASSESSMENT and PLAN    Mr. Juan Mahajan has history of CAD.  Around 2005, he had 4 stents placed at [facility].  In December 2016, he presented to [facility] with increased exertional chest pains for several months. [facility] was diagnosed with ACS. [facility] subsequently underwent angioplasty and stent of proximal RCA 90% ISR residual 0% using DERIAN.  He also had focal LAD 90% ISR stented to 0%. He had been on Ani Prader, this is switched to Plavix as he has had frequent epistaxis. [facility] has also noticed some blood on the toilet paper. Jeovany Shetty will defer further evaluation of rectal blood to the PCP.  I did switch his Brilinta to Plavix today.  Hopefully, this will improve his nosebleeds. · CAD:    Symptomatically stable. · Dizziness/syncope: He was seen at outside hospital and admitted for 4 days. During that time, multiple tests were obtained, which were told to the patient that it was unremarkable. I cannot get the results at this time. However, they discontinued his Toprol-XL 50 mg daily and started him on amlodipine. They felt that his dizziness and presyncope were from being dehydrated. · BP:   Well controlled. · HR:    Stable. However, his heart rate is 90 bpm.  Previously, his heart rate had been running 60s to 70s. I did resume his metoprolol XL 50 mg daily; I discontinued amlodipine. · CHF:    Currently, there is no evidence of decompensated CHF noted. · Weight:    His weight today is 164 pounds. He has lost 7 pounds since July 2018. I suspect that he his weight was less when he was admitted to the outside hospital.  I advised him to drink enough fluids to maintain his current weight. He states that sometimes he forgets to eat when he is working as a . · Cholesterol:   Target LDL <70. · Anti-platelet:   Remains on ASA, and Plavix.      I will have him come in and see my nurse practitioner in about 2 to 3 weeks to have his heart rate and blood pressure rechecked. I suspect that his dizziness spell was from dehydration; I have resumed his previous cardiac medication. Obviously, beta-blocker has protective component to CAD. Over 30 minutes spent on discussion with the patient, and his wife about his recent hospitalization and therapy. I explained to them at length about the reason to use beta-blocker rather than amlodipine. I did advise him to make sure that he maintains his weight. His wife does state that he sometimes has no time to eat or drink and goes without eating or drinking for a whole day. I advised against this. I will see him back in 6-9 months.  Thank you. Encounter Diagnoses   Name Primary?  Coronary artery disease involving native coronary artery of native heart without angina pectoris Yes     current treatment plan is effective, no change in therapy  lab results and schedule of future lab studies reviewed with patient  reviewed diet, exercise and weight control  cardiovascular risk and specific lipid/LDL goals reviewed  reviewed medications and side effects in detail  use of aspirin to prevent MI and TIA's discussed        HPI  Today, Mr. Marlys Cabrera has no complaints of chest pains. He denies any recent changes in exercise capacity or breathing status. About a week ago, he was admitted to [facility] when he presented with dizziness and syncope. During that hospitalization, he was told that everything looked okay but they discharged him to home with event monitor. They also discontinued his Toprol-XL and start him on amlodipine. He was accompanied by his wife. He has not had any further episodes. They told him that he had been dehydrated. He did lose about 10 pounds since his last visit in July 2018. Review of Systems   Respiratory: Negative for shortness of breath.     Cardiovascular: Negative for chest pain, palpitations, orthopnea, claudication, leg swelling and PND. Neurological: Positive for dizziness. All other systems reviewed and are negative. Physical Exam   Constitutional: He is oriented to person, place, and time. He appears well-developed and well-nourished. HENT:   Head: Normocephalic. Eyes: Conjunctivae are normal.   Neck: Neck supple. No JVD present. Carotid bruit is not present. No thyromegaly present. Cardiovascular: Normal rate and regular rhythm. Pulmonary/Chest: Breath sounds normal.   Abdominal: Bowel sounds are normal.   Musculoskeletal: He exhibits no edema. Neurological: He is alert and oriented to person, place, and time. Skin: Skin is warm and dry. Nursing note and vitals reviewed. PCP: Carlos Pérez MD    Past Medical History:   Diagnosis Date    CAD (coronary artery disease)     Essential hypertension     Hyperlipidemia        Past Surgical History:   Procedure Laterality Date    CARDIAC CATHETERIZATION  12/28/2016         CORONARY ARTERY ANGIOGRAM  12/28/2016         CORONARY STENT EA ADDL VESSEL  12/28/2016         CORONARY STENT SINGLE W/PTCA  12/28/2016         HX CORONARY STENT PLACEMENT      HX HEART CATHETERIZATION      LV ANGIOGRAPHY  12/28/2016            Current Outpatient Medications   Medication Sig Dispense Refill    ferrous sulfate 325 mg (65 mg iron) tablet Take 325 mg by mouth.  clopidogrel (PLAVIX) 75 mg tab TAKE ONE TABLET BY MOUTH ONCE DAILY 90 Tab 3    metFORMIN (GLUCOPHAGE) 500 mg tablet Take 250 mg by mouth daily (with breakfast).  red yeast rice extract 600 mg cap Take 600 mg by mouth now.  UBIDECARENONE/VITAMIN E MIXED (COQ10  PO) Take  by mouth.  nitroglycerin (NITROSTAT) 0.4 mg SL tablet 1 Tab by SubLINGual route every five (5) minutes as needed for Chest Pain. 25 Tab 3    atorvastatin (LIPITOR) 10 mg tablet Take 1 Tab by mouth nightly. 30 Tab 2    ramipril (ALTACE) 10 mg capsule Take 10 mg by mouth daily.       aspirin 81 mg chewable tablet Take 81 mg by mouth daily.  metoprolol succinate (TOPROL-XL) 50 mg XL tablet TAKE 1 TABLET BY MOUTH ONCE DAILY 90 Tab 3       The patient has a family history of    Social History     Tobacco Use    Smoking status: Never Smoker    Smokeless tobacco: Never Used   Substance Use Topics    Alcohol use: No    Drug use: No       Lab Results   Component Value Date/Time    Cholesterol, total 178 12/29/2016 05:10 AM    HDL Cholesterol 34 (L) 12/29/2016 05:10 AM    LDL, calculated 96.8 12/29/2016 05:10 AM    Triglyceride 236 (H) 12/29/2016 05:10 AM    CHOL/HDL Ratio 5.2 (H) 12/29/2016 05:10 AM        BP Readings from Last 3 Encounters:   04/30/19 128/80   07/10/18 154/70   10/17/17 128/72        Pulse Readings from Last 3 Encounters:   04/30/19 90   07/10/18 70   10/17/17 72       Wt Readings from Last 3 Encounters:   04/30/19 74.4 kg (164 lb)   07/10/18 77.6 kg (171 lb)   10/17/17 78 kg (172 lb)         EKG: unchanged from previous tracings, normal sinus rhythm, LBBB.

## 2019-04-30 NOTE — PROGRESS NOTES
Carine Burton presents today for No chief complaint on file. Carine Burton preferred language for health care discussion is english/other. Is someone accompanying this pt? No     Is the patient using any DME equipment during OV? no    Depression Screening:  No flowsheet data found. Learning Assessment:  Learning Assessment 7/10/2018   PRIMARY LEARNER Patient   PRIMARY LANGUAGE ENGLISH   LEARNER PREFERENCE PRIMARY DEMONSTRATION   ANSWERED BY Patient   RELATIONSHIP SELF       Abuse Screening:  No flowsheet data found. Fall Risk  No flowsheet data found. Pt currently taking Anticoagulant therapy? No     Coordination of Care:  1. Have you been to the ER, urgent care clinic since your last visit? Hospitalized since your last visit? Yes last week [facility]     2. Have you seen or consulted any other health care providers outside of the [address] [place] [address] since your last visit? Include any pap smears or colon screening.  no

## 2019-05-16 NOTE — PROGRESS NOTES
HPI:  Bo Perera is a 69 y.o. male presenting for a 3 week blood pressure and heart rate check. He was last seen by Dr. Eloisa Renee on 4/30/19 for routine follow up. At that time, he reported that he had been hospitalized recently for episodes of dizziness with syncope and his Toprol XL was discontinued and he was started on amlodipine. His heart rate was mildly elevated in the 90's at his previous visit, so he was resumed on his previous regimen of Toprol and amlodipine was discontinued. He presents today reporting that he has been doing well. He has continued to have some intermittent dizziness but denies syncope. His heart rate is low today at 47 bpm but he is asymptomatic. His BP is mildly elevated at 144/72 mmHg. He denies any chest discomfort, palpitations, weakness, shortness of breath, orthopnea or PND. He has a past medical history of CAD s/p PCI/stenting x 4 in 2005 at [facility] and again x 2 in Dec 2016 at [facility] (details below). He also has a history of LBBB, hypertension, hyperlipidemia and former tobacco use. Cardiac Imaging/Procedures:   Echocardiogram 12/26/19:  EF 50%, hypokinesis of basal-mid inferoseptal, basal inferior and apical septal wall. Grade 1 diastolic dysfunction, dyssynergic ventricular septal motion    Cardiac catheterization 12/28/2016:   · LM: distal 30%  · LAD: ostial 30%, distal segment of proximal stent has 90% ISR, diffuse native 30-40% distal disease  · Diagonal branches: angiographically normal  · LCx: angiographically normal   · OM branches: angiographically normal  · RCA: long proximal diffuse 90% with proximal stent segment ISR 95%  Intervention: PCI/stenting of RCA and focal LAD ISR to 0% residual       Impression/Plan:  1. Dizziness  2. CAD s/p PCI/stenting in 2005 and 2016, on ASA and plavix   3. LBBB  4. Essential hypertension  5.  Hyperlipidemia, on atorvastatin 10 mg    Patient is presenting for a blood pressure and heart rate check after being restarted on Toprol XL 50 mg. He presents today and is doing well overall. He does have some continued episodes of dizziness but denies syncope. He also has a heart rate of 47 bpm which could be contributing to his dizziness. At this time, I would like to decrease his Toprol to 25 mg daily. I would also like to restart low dose amlodipine at 2.5 mg daily as he BP is 144/72 mmHg today and will likely increase with reduction of his BB. He will continue to monitor his pressure and heart rates at home and follow up for recheck in about a month. No further medication adjustments will be made at this time. All questions were answered. He will follow up as scheduled and as needed. Varun Álvarez PA-C          Physical:  Vitals:  Visit Vitals  /72   Pulse (!) 46   Ht 5' 8\" (1.727 m)   Wt 75.3 kg (166 lb)   SpO2 97%   BMI 25.24 kg/m²       PMH:  Past Medical History:   Diagnosis Date    CAD (coronary artery disease)     Essential hypertension     Hyperlipidemia        MEDS:  Current Outpatient Medications on File Prior to Visit   Medication Sig Dispense Refill    clopidogrel (PLAVIX) 75 mg tab TAKE ONE TABLET BY MOUTH ONCE DAILY 90 Tab 3    metFORMIN (GLUCOPHAGE) 500 mg tablet Take 250 mg by mouth daily (with breakfast).  red yeast rice extract 600 mg cap Take 600 mg by mouth now.  UBIDECARENONE/VITAMIN E MIXED (COQ10  PO) Take  by mouth.  nitroglycerin (NITROSTAT) 0.4 mg SL tablet 1 Tab by SubLINGual route every five (5) minutes as needed for Chest Pain. 25 Tab 3    atorvastatin (LIPITOR) 10 mg tablet Take 1 Tab by mouth nightly. 30 Tab 2    ramipril (ALTACE) 10 mg capsule Take 10 mg by mouth daily.  aspirin 81 mg chewable tablet Take 81 mg by mouth daily. No current facility-administered medications on file prior to visit. Allergies and Sensitivities:  No Known Allergies    Family History:  No family history on file. Social History:  He  reports that he has never smoked.  He has never used smokeless tobacco.  He  reports that he does not drink alcohol.       Review of Systems:    Constitutional: negative for fevers, chills, sweats, fatigue and malaise  Respiratory: negative for cough  Cardiovascular: negative for chest pain, palpitations, syncope, dyspnea on exertion, SOB, PND, orthopnea  Gastrointestinal: negative for nausea, vomiting, diarrhea, melena and abdominal pain  Genitourinary: negative for hematuria  Musculoskeletal: negative for lower extremity swelling or claudication   Neurological: Positive for dizziness   Behavioral/Psych: negative for anxiety         Physical Exam:     General appearance: no apparent distress  HEENT: normocephalic, atraumatic  Neck: supple, no JVD, no carotid bruits   Respiratory: clear to auscultation bilaterally  Cardiovascular: normal S1 and S2,  regular rhythm, bradycardic, no murmurs  Abdomen: soft, non-tender, no hepatomegaly  Extremities: no lower extremity edema   Neurological: alert and oriented to person, place and time  Behavioral/Psych: normal mood and affect       Data:  EKG: Not completed today     LABS:  Lab Results   Component Value Date/Time    Sodium 139 12/29/2016 05:10 AM    Potassium 3.7 12/29/2016 05:10 AM    Chloride 103 12/29/2016 05:10 AM    CO2 25 12/29/2016 05:10 AM    Glucose 111 (H) 12/29/2016 05:10 AM    BUN 17 12/29/2016 05:10 AM    Creatinine 1.18 12/29/2016 05:10 AM     Lab Results   Component Value Date/Time    Cholesterol, total 178 12/29/2016 05:10 AM    HDL Cholesterol 34 (L) 12/29/2016 05:10 AM    LDL, calculated 96.8 12/29/2016 05:10 AM    Triglyceride 236 (H) 12/29/2016 05:10 AM    CHOL/HDL Ratio 5.2 (H) 12/29/2016 05:10 AM     Lab Results   Component Value Date/Time    ALT (SGPT) 22 12/27/2016 02:38 PM

## 2019-05-17 ENCOUNTER — OFFICE VISIT (OUTPATIENT)
Dept: CARDIOLOGY CLINIC | Age: 79
End: 2019-05-17

## 2019-05-17 VITALS
DIASTOLIC BLOOD PRESSURE: 72 MMHG | OXYGEN SATURATION: 97 % | HEIGHT: 68 IN | HEART RATE: 46 BPM | SYSTOLIC BLOOD PRESSURE: 144 MMHG | WEIGHT: 166 LBS | BODY MASS INDEX: 25.16 KG/M2

## 2019-05-17 DIAGNOSIS — I10 ESSENTIAL HYPERTENSION: ICD-10-CM

## 2019-05-17 DIAGNOSIS — I25.10 CORONARY ARTERY DISEASE INVOLVING NATIVE CORONARY ARTERY OF NATIVE HEART WITHOUT ANGINA PECTORIS: Primary | ICD-10-CM

## 2019-05-17 DIAGNOSIS — I44.7 LBBB (LEFT BUNDLE BRANCH BLOCK): ICD-10-CM

## 2019-05-17 DIAGNOSIS — E78.5 DYSLIPIDEMIA: ICD-10-CM

## 2019-05-17 RX ORDER — AMLODIPINE BESYLATE 2.5 MG/1
2.5 TABLET ORAL DAILY
Qty: 30 TAB | Refills: 6
Start: 2019-05-17 | End: 2019-12-10 | Stop reason: SDUPTHER

## 2019-05-17 RX ORDER — METOPROLOL SUCCINATE 25 MG/1
TABLET, EXTENDED RELEASE ORAL
Qty: 30 TAB | Refills: 6
Start: 2019-05-17 | End: 2020-08-12

## 2019-05-17 RX ORDER — LANOLIN ALCOHOL/MO/W.PET/CERES
325 CREAM (GRAM) TOPICAL DAILY
Qty: 30 TAB | Refills: 1 | Status: SHIPPED | OUTPATIENT
Start: 2019-05-17 | End: 2019-06-16

## 2019-05-17 NOTE — PATIENT INSTRUCTIONS
Decrease the Toprol XL to 25 mg once a day. Start amlodipine 2.5 mg once daily. All other medications to remain the same   Continue to monitor blood pressures and heart rate daily. Follow up for BP and HR check as scheduled in one month or sooner if need.

## 2019-07-12 ENCOUNTER — CLINICAL SUPPORT (OUTPATIENT)
Dept: CARDIOLOGY CLINIC | Age: 79
End: 2019-07-12

## 2019-07-12 VITALS
OXYGEN SATURATION: 98 % | SYSTOLIC BLOOD PRESSURE: 140 MMHG | WEIGHT: 168 LBS | HEART RATE: 73 BPM | BODY MASS INDEX: 25.46 KG/M2 | HEIGHT: 68 IN | DIASTOLIC BLOOD PRESSURE: 60 MMHG

## 2019-07-12 DIAGNOSIS — I10 ESSENTIAL HYPERTENSION: Primary | ICD-10-CM

## 2019-07-12 NOTE — PROGRESS NOTES
Rosario Perez is a 69 y.o. male that is here for a blood pressure check per ISIDRO Watson. His current medications are listed below. Current Outpatient Medications   Medication Sig    metoprolol succinate (TOPROL-XL) 25 mg XL tablet TAKE 1 TABLET BY MOUTH ONCE DAILY    amLODIPine (NORVASC) 2.5 mg tablet Take 1 Tab by mouth daily.  clopidogrel (PLAVIX) 75 mg tab TAKE ONE TABLET BY MOUTH ONCE DAILY    metFORMIN (GLUCOPHAGE) 500 mg tablet Take 250 mg by mouth daily (with breakfast).  red yeast rice extract 600 mg cap Take 600 mg by mouth now.  UBIDECARENONE/VITAMIN E MIXED (COQ10  PO) Take  by mouth.  nitroglycerin (NITROSTAT) 0.4 mg SL tablet 1 Tab by SubLINGual route every five (5) minutes as needed for Chest Pain.  atorvastatin (LIPITOR) 10 mg tablet Take 1 Tab by mouth nightly.  ramipril (ALTACE) 10 mg capsule Take 10 mg by mouth daily.  aspirin 81 mg chewable tablet Take 81 mg by mouth daily. No current facility-administered medications for this visit. His   Visit Vitals  /60   Pulse 73   Ht 5' 8\" (1.727 m)   Wt 76.2 kg (168 lb)   SpO2 98%   BMI 25.54 kg/m²     Patient came in today for 3 week blood pressure check after his Toprol-XL was decreased to 25mg every day and started on Norvasc 2.5mg every day. Patient denies any chest pain, shortness of breath, swelling or palpitations. Patient complains of having dizziness, but doesn't think it's coming from his BP. He stated that he's mentioned this to his PCP multiple times. I stated to the patient that he could try an ENT specialist. Patient will call us if anything changes. Verbal order and read back per Dr. Pearl Henning     Patient can continue current medicine regimen, keep scheduled appt with physician.

## 2019-10-14 RX ORDER — CLOPIDOGREL BISULFATE 75 MG/1
TABLET ORAL
Qty: 90 TAB | Refills: 3 | Status: SHIPPED | OUTPATIENT
Start: 2019-10-14 | End: 2020-11-27

## 2019-12-10 ENCOUNTER — OFFICE VISIT (OUTPATIENT)
Dept: CARDIOLOGY CLINIC | Age: 79
End: 2019-12-10

## 2019-12-10 VITALS
BODY MASS INDEX: 25.46 KG/M2 | WEIGHT: 168 LBS | OXYGEN SATURATION: 98 % | HEIGHT: 68 IN | DIASTOLIC BLOOD PRESSURE: 78 MMHG | SYSTOLIC BLOOD PRESSURE: 138 MMHG

## 2019-12-10 DIAGNOSIS — I44.7 LBBB (LEFT BUNDLE BRANCH BLOCK): ICD-10-CM

## 2019-12-10 DIAGNOSIS — I25.10 CORONARY ARTERY DISEASE INVOLVING NATIVE CORONARY ARTERY OF NATIVE HEART WITHOUT ANGINA PECTORIS: Primary | ICD-10-CM

## 2019-12-10 DIAGNOSIS — I10 ESSENTIAL HYPERTENSION: ICD-10-CM

## 2019-12-10 RX ORDER — AMLODIPINE BESYLATE 2.5 MG/1
2.5 TABLET ORAL DAILY
Qty: 30 TAB | Refills: 6 | Status: SHIPPED | OUTPATIENT
Start: 2019-12-10 | End: 2020-08-13

## 2019-12-10 NOTE — PROGRESS NOTES
HISTORY OF PRESENT ILLNESS  Raphael Mack is a 67 y.o. male. ASSESSMENT and PLAN    Mr. Raphael Mack has history of CAD.  Around 2005, he had 4 stents placed at [facility].  In December 2016, he presented to [facility] with increased exertional chest pains for several months. [facility] was diagnosed with ACS. [facility] subsequently underwent angioplasty and stent of proximal RCA 90% ISR residual 0% using DERIAN.  He also had focal LAD 90% ISR stented to 0%. He had been on Barbaraann Chai, this is switched to Plavix as he has had frequent epistaxis. [facility] has also noticed some blood on the toilet paper. Makenna Mercado will defer further evaluation of rectal blood to the PCP.  I did switch his Brilinta to Plavix today.  Hopefully, this will improve his nosebleeds. · CAD:    Clinically stable. · BP:   Well controlled. · HR:    Stable. His heart rate is much better controlled on current medication regimen. · CHF:    There is no evidence of decompensated CHF noted. · Weight:    His weight today is 168 pounds. His baseline weight is between 165-170 pounds. · Cholesterol:   Target LDL <70. Lipitor 10. · Anti-platelet:   Remains on ASA, and Plavix. I will see him back in 6-9 months.  Thank you. Encounter Diagnoses   Name Primary?  Coronary artery disease involving native coronary artery of native heart without angina pectoris Yes    Essential hypertension     LBBB (left bundle branch block)      current treatment plan is effective, no change in therapy  lab results and schedule of future lab studies reviewed with patient  reviewed diet, exercise and weight control  cardiovascular risk and specific lipid/LDL goals reviewed  use of aspirin to prevent MI and TIA's discussed        HPI   Today, Mr. Briana Kee has no complaints of chest pains, increased shortness of breath or decreased exercise capacity. He denies any orthopnea or PND. He denies any palpitations or dizziness.   Despite his age, he continues to work as a  at [employer], [place]. He has no apparent exertional limitations at this time. Review of Systems   Respiratory: Negative for shortness of breath. Cardiovascular: Negative for chest pain, palpitations, orthopnea, claudication, leg swelling and PND. All other systems reviewed and are negative. Physical Exam   Constitutional: He is oriented to person, place, and time. He appears well-developed and well-nourished. HENT:   Head: Normocephalic. Eyes: Conjunctivae are normal.   Neck: Neck supple. No JVD present. Carotid bruit is not present. No thyromegaly present. Cardiovascular: Normal rate and regular rhythm. Pulmonary/Chest: Breath sounds normal.   Abdominal: Bowel sounds are normal.   Musculoskeletal:         General: No edema. Neurological: He is alert and oriented to person, place, and time. Skin: Skin is warm and dry. Nursing note and vitals reviewed. PCP: Anahi Maldonado MD    Past Medical History:   Diagnosis Date    CAD (coronary artery disease)     Essential hypertension     Hyperlipidemia        Past Surgical History:   Procedure Laterality Date    CARDIAC CATHETERIZATION  12/28/2016         CORONARY ARTERY ANGIOGRAM  12/28/2016         CORONARY STENT EA ADDL VESSEL  12/28/2016         CORONARY STENT SINGLE W/PTCA  12/28/2016         HX CORONARY STENT PLACEMENT      HX HEART CATHETERIZATION      LV ANGIOGRAPHY  12/28/2016            Current Outpatient Medications   Medication Sig Dispense Refill    amLODIPine (NORVASC) 2.5 mg tablet Take 1 Tab by mouth daily. 30 Tab 6    clopidogrel (PLAVIX) 75 mg tab TAKE 1 TABLET BY MOUTH ONCE DAILY 90 Tab 3    metoprolol succinate (TOPROL-XL) 25 mg XL tablet TAKE 1 TABLET BY MOUTH ONCE DAILY 30 Tab 6    metFORMIN (GLUCOPHAGE) 500 mg tablet Take 250 mg by mouth daily (with breakfast).  red yeast rice extract 600 mg cap Take 600 mg by mouth now.       UBIDECARENONE/VITAMIN E MIXED (COQ10  PO) Take  by mouth.  nitroglycerin (NITROSTAT) 0.4 mg SL tablet 1 Tab by SubLINGual route every five (5) minutes as needed for Chest Pain. 25 Tab 3    atorvastatin (LIPITOR) 10 mg tablet Take 1 Tab by mouth nightly. 30 Tab 2    ramipril (ALTACE) 10 mg capsule Take 10 mg by mouth daily.  aspirin 81 mg chewable tablet Take 81 mg by mouth daily. The patient has a family history of    Social History     Tobacco Use    Smoking status: Never Smoker    Smokeless tobacco: Never Used   Substance Use Topics    Alcohol use: No    Drug use: No       Lab Results   Component Value Date/Time    Cholesterol, total 178 12/29/2016 05:10 AM    HDL Cholesterol 34 (L) 12/29/2016 05:10 AM    LDL, calculated 96.8 12/29/2016 05:10 AM    Triglyceride 236 (H) 12/29/2016 05:10 AM    CHOL/HDL Ratio 5.2 (H) 12/29/2016 05:10 AM        BP Readings from Last 3 Encounters:   12/10/19 138/78   07/12/19 140/60   05/17/19 144/72        Pulse Readings from Last 3 Encounters:   07/12/19 73   05/17/19 (!) 46   04/30/19 90       Wt Readings from Last 3 Encounters:   12/10/19 76.2 kg (168 lb)   07/12/19 76.2 kg (168 lb)   05/17/19 75.3 kg (166 lb)         EKG: unchanged from previous tracings, normal sinus rhythm, LBBB.

## 2019-12-10 NOTE — PROGRESS NOTES
Carola Aldana presents today for No chief complaint on file. Carola Aldana preferred language for health care discussion is english/other. Is someone accompanying this pt? n    Is the patient using any DME equipment during OV? n    Depression Screening:  No flowsheet data found. Learning Assessment:  Learning Assessment 7/10/2018   PRIMARY LEARNER Patient   PRIMARY LANGUAGE ENGLISH   LEARNER PREFERENCE PRIMARY DEMONSTRATION   ANSWERED BY Patient   RELATIONSHIP SELF       Abuse Screening:  No flowsheet data found. Fall Risk  No flowsheet data found. Pt currently taking Anticoagulant therapy? y    Coordination of Care:  1. Have you been to the ER, urgent care clinic since your last visit? Hospitalized since your last visit? n    2. Have you seen or consulted any other health care providers outside of the [address] [place] [address] since your last visit?  Include any pap smears or colon screening. y

## 2020-08-12 RX ORDER — ATORVASTATIN CALCIUM 10 MG/1
TABLET, FILM COATED ORAL
Qty: 90 TAB | Refills: 0 | Status: SHIPPED | OUTPATIENT
Start: 2020-08-12 | End: 2020-12-01

## 2020-08-12 RX ORDER — METOPROLOL SUCCINATE 25 MG/1
TABLET, EXTENDED RELEASE ORAL
Qty: 30 TAB | Refills: 0 | Status: SHIPPED | OUTPATIENT
Start: 2020-08-12 | End: 2020-09-26

## 2020-08-13 RX ORDER — AMLODIPINE BESYLATE 2.5 MG/1
TABLET ORAL
Qty: 30 TAB | Refills: 0 | Status: SHIPPED | OUTPATIENT
Start: 2020-08-13 | End: 2020-09-25

## 2020-09-10 RX ORDER — METFORMIN HYDROCHLORIDE 500 MG/1
TABLET ORAL
Qty: 45 TAB | Refills: 0 | Status: SHIPPED | OUTPATIENT
Start: 2020-09-10 | End: 2021-06-21 | Stop reason: SDUPTHER

## 2020-09-10 RX ORDER — RAMIPRIL 10 MG/1
CAPSULE ORAL
Qty: 90 CAP | Refills: 0 | Status: SHIPPED | OUTPATIENT
Start: 2020-09-10 | End: 2020-12-14

## 2020-09-15 ENCOUNTER — OFFICE VISIT (OUTPATIENT)
Dept: CARDIOLOGY CLINIC | Age: 80
End: 2020-09-15

## 2020-09-15 VITALS
SYSTOLIC BLOOD PRESSURE: 130 MMHG | WEIGHT: 168 LBS | DIASTOLIC BLOOD PRESSURE: 80 MMHG | BODY MASS INDEX: 25.54 KG/M2 | OXYGEN SATURATION: 95 %

## 2020-09-15 DIAGNOSIS — I25.10 CORONARY ARTERY DISEASE INVOLVING NATIVE CORONARY ARTERY OF NATIVE HEART WITHOUT ANGINA PECTORIS: Primary | ICD-10-CM

## 2020-09-15 DIAGNOSIS — R00.2 PALPITATIONS: ICD-10-CM

## 2020-09-15 NOTE — PROGRESS NOTES
HISTORY OF PRESENT ILLNESS  Patrick Rivera is a 67 y.o. male. ASSESSMENT and PLAN    Mr. Patrick Rivera has history of CAD.  Around 2005, he had 4 stents placed at [facility].  In December 2016, he presented to [facility] with increased exertional chest pains for several months. [facility] was diagnosed with ACS. [facility] subsequently underwent angioplasty and stent of proximal RCA 90% ISR residual 0% using DERIAN.  He also had focal LAD 90% ISR stented to 0%. He had been on Saad Stephen, this was switched to Plavix as he has had frequent epistaxis. [facility] has also noticed some blood on the toilet paper. Olena Anaya will defer further evaluation of rectal blood to the PCP. · CAD:    Symptomatically stable. · BP:   Well controlled. · HR:    Stable. · CHF:    Currently, there is no evidence of decompensated CHF noted. · Weight:    His weight today is 168 pounds. This is unchanged from his baseline. · Cholesterol:   Target LDL <70. Lipitor 10. · Anti-platelet:   Remains on ASA, and Plavix. I will see him back in 6-9 months.  Thank you. Encounter Diagnoses   Name Primary?  Coronary artery disease involving native coronary artery of native heart without angina pectoris Yes    Palpitations      current treatment plan is effective, no change in therapy  lab results and schedule of future lab studies reviewed with patient  cardiovascular risk and specific lipid/LDL goals reviewed  use of aspirin to prevent MI and TIA's discussed      HPI   Today, Mr. Jah Mcnair has no complaints of chest pains, increased shortness of breath or decreased exercise capacity. He does have baseline dyspnea on exertion which prevents his exercise capacity. However, his dyspnea on exertion has not changed over the last several years. He denies any orthopnea or PND. He denies any palpitations or dizziness. Review of Systems   Respiratory: Positive for shortness of breath.     Cardiovascular: Negative for chest pain, palpitations, orthopnea, claudication, leg swelling and PND. All other systems reviewed and are negative. Physical Exam  Vitals signs and nursing note reviewed. Constitutional:       Appearance: Normal appearance. HENT:      Head: Normocephalic. Eyes:      Conjunctiva/sclera: Conjunctivae normal.   Neck:      Musculoskeletal: No neck rigidity. Cardiovascular:      Rate and Rhythm: Normal rate and regular rhythm. Pulmonary:      Breath sounds: Normal breath sounds. Abdominal:      General: Bowel sounds are normal.      Palpations: Abdomen is soft. Musculoskeletal:         General: No swelling. Skin:     General: Skin is warm and dry. Neurological:      General: No focal deficit present. Mental Status: He is alert and oriented to person, place, and time.    Psychiatric:         Mood and Affect: Mood normal.         Behavior: Behavior normal.         PCP: Samantha Vargas MD    Past Medical History:   Diagnosis Date    CAD (coronary artery disease)     Essential hypertension     Hyperlipidemia        Past Surgical History:   Procedure Laterality Date    CARDIAC CATHETERIZATION  12/28/2016         CORONARY ARTERY ANGIOGRAM  12/28/2016         CORONARY STENT EA ADDL VESSEL  12/28/2016         CORONARY STENT SINGLE W/PTCA  12/28/2016         HX CORONARY STENT PLACEMENT      HX HEART CATHETERIZATION      LV ANGIOGRAPHY  12/28/2016            Current Outpatient Medications   Medication Sig Dispense Refill    ramipriL (ALTACE) 10 mg capsule TAKE 1 CAPSULE BY MOUTH ONCE DAILY AS DIRECTED FOR 90 DAYS 90 Cap 0    metFORMIN (GLUCOPHAGE) 500 mg tablet Take 1/2 (one-half) tablet by mouth once daily 45 Tab 0    amLODIPine (NORVASC) 2.5 mg tablet Take 1 tablet by mouth once daily 30 Tab 0    atorvastatin (LIPITOR) 10 mg tablet Take 1 tablet by mouth once daily for 90 days 90 Tab 0    metoprolol succinate (TOPROL-XL) 25 mg XL tablet Take 1 tablet by mouth once daily for 30 days 30 Tab 0    clopidogrel (PLAVIX) 75 mg tab TAKE 1 TABLET BY MOUTH ONCE DAILY 90 Tab 3    red yeast rice extract 600 mg cap Take 600 mg by mouth now.  UBIDECARENONE/VITAMIN E MIXED (COQ10  PO) Take  by mouth.  nitroglycerin (NITROSTAT) 0.4 mg SL tablet 1 Tab by SubLINGual route every five (5) minutes as needed for Chest Pain. 25 Tab 3    aspirin 81 mg chewable tablet Take 81 mg by mouth daily. The patient has a family history of    Social History     Tobacco Use    Smoking status: Never Smoker    Smokeless tobacco: Never Used   Substance Use Topics    Alcohol use: No    Drug use: No       Lab Results   Component Value Date/Time    Cholesterol, total 178 12/29/2016 05:10 AM    HDL Cholesterol 34 (L) 12/29/2016 05:10 AM    LDL, calculated 96.8 12/29/2016 05:10 AM    Triglyceride 236 (H) 12/29/2016 05:10 AM    CHOL/HDL Ratio 5.2 (H) 12/29/2016 05:10 AM        BP Readings from Last 3 Encounters:   09/15/20 130/80   12/10/19 138/78   07/12/19 140/60        Pulse Readings from Last 3 Encounters:   07/12/19 73   05/17/19 (!) 46   04/30/19 90       Wt Readings from Last 3 Encounters:   09/15/20 76.2 kg (168 lb)   12/10/19 76.2 kg (168 lb)   07/12/19 76.2 kg (168 lb)         EKG: unchanged from previous tracings, normal sinus rhythm, LBBB.

## 2020-09-15 NOTE — PROGRESS NOTES
Sarbjit Alicia presents today for No chief complaint on file. Sarbjit Alicia preferred language for health care discussion is english/other. Is someone accompanying this pt? no    Is the patient using any DME equipment during OV? no    Depression Screening:  No flowsheet data found. Learning Assessment:  Learning Assessment 7/10/2018   PRIMARY LEARNER Patient   PRIMARY LANGUAGE ENGLISH   LEARNER PREFERENCE PRIMARY DEMONSTRATION   ANSWERED BY Patient   RELATIONSHIP SELF       Abuse Screening:  No flowsheet data found. Fall Risk  No flowsheet data found. Pt currently taking Anticoagulant therapy? No     Coordination of Care:  1. Have you been to the ER, urgent care clinic since your last visit? Hospitalized since your last visit? No     2. Have you seen or consulted any other health care providers outside of the [address] [place] [address] since your last visit? Include any pap smears or colon screening.  no

## 2020-09-25 RX ORDER — AMLODIPINE BESYLATE 2.5 MG/1
TABLET ORAL
Qty: 30 TAB | Refills: 0 | Status: SHIPPED | OUTPATIENT
Start: 2020-09-25 | End: 2020-11-02

## 2020-09-26 RX ORDER — METOPROLOL SUCCINATE 25 MG/1
TABLET, EXTENDED RELEASE ORAL
Qty: 30 TAB | Refills: 0 | Status: SHIPPED | OUTPATIENT
Start: 2020-09-26 | End: 2020-10-30 | Stop reason: SDUPTHER

## 2020-10-30 ENCOUNTER — OFFICE VISIT (OUTPATIENT)
Dept: FAMILY MEDICINE CLINIC | Age: 80
End: 2020-10-30
Payer: MEDICARE

## 2020-10-30 VITALS — HEIGHT: 68 IN | BODY MASS INDEX: 25.16 KG/M2 | WEIGHT: 166 LBS

## 2020-10-30 DIAGNOSIS — E78.5 DYSLIPIDEMIA: ICD-10-CM

## 2020-10-30 DIAGNOSIS — E11.8 CONTROLLED TYPE 2 DIABETES MELLITUS WITH COMPLICATION, WITHOUT LONG-TERM CURRENT USE OF INSULIN (HCC): ICD-10-CM

## 2020-10-30 DIAGNOSIS — I10 ESSENTIAL HYPERTENSION: ICD-10-CM

## 2020-10-30 DIAGNOSIS — R20.2 PARESTHESIAS IN RIGHT HAND: ICD-10-CM

## 2020-10-30 DIAGNOSIS — R20.2 PARESTHESIAS IN RIGHT HAND: Primary | ICD-10-CM

## 2020-10-30 DIAGNOSIS — I25.10 CORONARY ARTERY DISEASE INVOLVING NATIVE CORONARY ARTERY OF NATIVE HEART WITHOUT ANGINA PECTORIS: ICD-10-CM

## 2020-10-30 PROCEDURE — 99213 OFFICE O/P EST LOW 20 MIN: CPT | Performed by: FAMILY MEDICINE

## 2020-10-30 RX ORDER — METOPROLOL SUCCINATE 25 MG/1
TABLET, EXTENDED RELEASE ORAL
Qty: 90 TAB | Refills: 2 | Status: SHIPPED | OUTPATIENT
Start: 2020-10-30 | End: 2021-06-21 | Stop reason: SDUPTHER

## 2020-10-30 NOTE — PROGRESS NOTES
Rosie Llanes presents today for No chief complaint on file. Is someone accompanying this pt? no    Is the patient using any DME equipment during [address]? no    Depression Screening:  3 most recent PHQ Screens 10/30/2020   Little interest or pleasure in doing things Not at all   Feeling down, depressed, irritable, or hopeless Not at all   Total Score PHQ 2 0       Learning Assessment:  Learning Assessment 10/30/2020   PRIMARY LEARNER Patient   PRIMARY LANGUAGE ENGLISH   LEARNER PREFERENCE PRIMARY VIDEOS   ANSWERED BY patient   RELATIONSHIP SELF       Abuse Screening:  No flowsheet data found. Fall Risk  Fall Risk Assessment, last 12 mths 10/30/2020   Able to walk? Yes   Fall in past 12 months? No       ADL  No flowsheet data found. Health Maintenance reviewed and discussed and ordered per Provider. Health Maintenance Due   Topic Date Due    DTaP/Tdap/Td series (1 - Tdap) 10/16/1962    Shingrix Vaccine Age 50> (1 of 2) 10/16/1991    GLAUCOMA SCREENING Q2Y  10/16/2006    Pneumococcal 65+ years (1 of 1 - PPSV23) 10/16/2006    Medicare Yearly Exam  03/14/2018    Lipid Screen  04/24/2020    Flu Vaccine (1) 09/01/2020   . Coordination of Care:  1. Have you been to the ER, urgent care clinic since your last visit? Hospitalized since your last visit? no    2. Have you seen or consulted any other health care providers outside of the [address] [place] [address] since your last visit? Include any pap smears or colon screening.  no    Providers orders his own labs, orders for colonoscopy, mammograms and referrals as needed    Last UDS Checked no  Last Pain contract signed: no

## 2020-10-30 NOTE — PROGRESS NOTES
Subjective: Luis Enrique Moses is a 79 y.o. male who was seen for No chief complaint on file. HPI the patient is a 78-year-old male who is seen for evaluation today he has been seen by cardiology recently. He has had 4 stents children placed 2 years ago when he is insistent that the cardiologist told him he clean the first 2 stents he has hypertension he has diabetes mellitus has not had an A1c lately he complains of paresthesias in both hands he cannot tell me what the little fingers are involved but thinks the tips are he is not had a B12 or folate acid. He has had no falls or injuries and no rashes. His bowel movements are appropriate no urinary tract symptoms. Nobody at home has been sick he is eating relatively well. With his wife who is diabetic. He has hyperlipidemia he has not had blood work. The cardiologist did not request any other testing at all. He did not do any of his own. He sleeps well at night. He denies any anxiety or depression. A little neck discomfort and had an accident some years ago it is hard to flex his neck and extended. Turning to the side is not too difficult    Home Medications    Medication Sig Start Date End Date Taking?  Authorizing Provider   metoprolol succinate (TOPROL-XL) 25 mg XL tablet Take 1 tablet by mouth once daily 9/26/20  Yes Malinda Pierson MD   amLODIPine (NORVASC) 2.5 mg tablet Take 1 tablet by mouth once daily 9/25/20  Yes Og Tobias MD   ramipriL (ALTACE) 10 mg capsule TAKE 1 CAPSULE BY MOUTH ONCE DAILY AS DIRECTED FOR 90 DAYS 9/10/20  Yes Malinda Pierson MD   metFORMIN (GLUCOPHAGE) 500 mg tablet Take 1/2 (one-half) tablet by mouth once daily 9/10/20  Yes Malinda Pierson MD   atorvastatin (LIPITOR) 10 mg tablet Take 1 tablet by mouth once daily for 90 days 8/12/20  Yes Malinda Pierson MD   clopidogrel (PLAVIX) 75 mg tab TAKE 1 TABLET BY MOUTH ONCE DAILY 10/14/19  Yes Kelsea Eller MD   red yeast rice extract 600 mg cap Take 600 mg by mouth now. Yes Provider, Historical   UBIDECARENONE/VITAMIN E MIXED (COQ10  PO) Take  by mouth. Yes Provider, Historical   nitroglycerin (NITROSTAT) 0.4 mg SL tablet 1 Tab by SubLINGual route every five (5) minutes as needed for Chest Pain. 1/30/17  Yes Frankey Hinds P, NP   aspirin 81 mg chewable tablet Take 81 mg by mouth daily. Yes Provider, Historical      No Known Allergies  Social History     Tobacco Use    Smoking status: Never Smoker    Smokeless tobacco: Never Used   Substance Use Topics    Alcohol use: No    Drug use: No            Review of Systems   HENT: Negative. Eyes: Negative. Respiratory: Negative. Cardiovascular: Negative. Gastrointestinal: Negative. Endocrine: Negative. Genitourinary: Negative. Musculoskeletal: Positive for arthralgias and neck pain. Allergic/Immunologic: Negative. Neurological: Positive for numbness. Hematological: Negative. Psychiatric/Behavioral: Negative. Physical Exam   Objective:     Visit Vitals  Ht 5' 8\" (1.727 m)   Wt 166 lb (75.3 kg)   BMI 25.24 kg/m²      General: alert, cooperative, no distress   Mental  status: normal mood, behavior, speech, dress, motor activity, and thought processes, able to follow commands   HENT: NCAT   Neck: no visualized mass   Resp: no respiratory distress   Neuro: no gross deficits   Skin: no discoloration or lesions of concern on visible areas   Psychiatric: normal affect, consistent with stated mood, no evidence of hallucinations   Afebrile. Vital signs are stable. The pupils are equal and reactive. The chest is clear. The cardiovascular exam showed a regular rate and rhythm. The abdomen is benign.   He has no edema he has significant osteoarthritis especially in his hands his pulses seem to be appropriate in his feet there are no skin lesions he is oriented x3 he is not clinically anxious or depressed    Assessment & Plan:     Beatties mellitus, paresthesias, hypertension, hyperlipidemia, coronary artery disease status post stenting x4: We had a long discussion about whether or not he still needed Plavix I am very doubtful that he does the patient is going to take up that with his cardiologist.  He uses Tylenol for arthritis. He has had no falls or injuries. He is alert and cooperative in no significant distress. His cardiology evaluation recently was acceptable. No rashes. He seems to be medically stable I am going to get blood work I am going to check a V54 and folic acid with the paresthesias and also check a cervical spine film he does not have the same findings in his feet        712    Additional exam findings: We discussed the expected course, resolution and complications of the diagnosis(es) in detail. Medication risks, benefits, costs, interactions, and alternatives were discussed as indicated. I advised him to contact the office if his condition worsens, changes or fails to improve as anticipated. He expressed understanding with the diagnosis(es) and plan.

## 2020-11-02 ENCOUNTER — HOSPITAL ENCOUNTER (OUTPATIENT)
Dept: GENERAL RADIOLOGY | Age: 80
Discharge: HOME | End: 2020-11-02
Attending: FAMILY MEDICINE
Payer: MEDICARE

## 2020-11-02 ENCOUNTER — HOSPITAL ENCOUNTER (EMERGENCY)
Age: 80
Discharge: HOME | End: 2020-11-02
Attending: EMERGENCY MEDICINE
Payer: MEDICARE

## 2020-11-02 ENCOUNTER — HOSPITAL ENCOUNTER (OUTPATIENT)
Dept: LAB | Age: 80
Discharge: HOME | End: 2020-11-02
Payer: MEDICARE

## 2020-11-02 ENCOUNTER — APPOINTMENT (OUTPATIENT)
Dept: GENERAL RADIOLOGY | Age: 80
End: 2020-11-02
Attending: EMERGENCY MEDICINE
Payer: MEDICARE

## 2020-11-02 ENCOUNTER — APPOINTMENT (OUTPATIENT)
Dept: CT IMAGING | Age: 80
End: 2020-11-02
Attending: EMERGENCY MEDICINE
Payer: MEDICARE

## 2020-11-02 VITALS
HEART RATE: 67 BPM | WEIGHT: 165 LBS | HEIGHT: 68 IN | OXYGEN SATURATION: 100 % | BODY MASS INDEX: 25.01 KG/M2 | RESPIRATION RATE: 16 BRPM | DIASTOLIC BLOOD PRESSURE: 82 MMHG | SYSTOLIC BLOOD PRESSURE: 129 MMHG

## 2020-11-02 DIAGNOSIS — R20.2 PARESTHESIAS IN RIGHT HAND: ICD-10-CM

## 2020-11-02 DIAGNOSIS — R55 VASOVAGAL SYNCOPE: Primary | ICD-10-CM

## 2020-11-02 LAB
ALBUMIN SERPL-MCNC: 3.9 G/DL (ref 3.5–4.7)
ALBUMIN/GLOB SERPL: 1 {RATIO}
ALP SERPL-CCNC: 71 U/L (ref 38–126)
ALT SERPL-CCNC: 26 U/L (ref 3–72)
ANION GAP SERPL CALC-SCNC: 11 MMOL/L
AST SERPL W P-5'-P-CCNC: 25 U/L (ref 17–74)
ATRIAL RATE: 71 BPM
BASOPHILS # BLD: 0 K/UL (ref 0–0.1)
BASOPHILS NFR BLD: 0 % (ref 0–2)
BILIRUB SERPL-MCNC: 0.7 MG/DL (ref 0.2–1)
BUN SERPL-MCNC: 19 MG/DL (ref 9–21)
BUN/CREAT SERPL: 17
CA-I BLD-MCNC: 8.8 MG/DL (ref 8.5–10.5)
CALCULATED P AXIS, ECG09: 67 DEGREES
CALCULATED R AXIS, ECG10: 30 DEGREES
CALCULATED T AXIS, ECG11: 49 DEGREES
CHLORIDE SERPL-SCNC: 103 MMOL/L (ref 94–111)
CO2 SERPL-SCNC: 20 MMOL/L (ref 21–33)
CREAT SERPL-MCNC: 1.1 MG/DL (ref 0.8–1.5)
D DIMER PPP FEU-MCNC: 0.53 UG/ML(FEU)
DIAGNOSIS, 93000: NORMAL
EOSINOPHIL # BLD: 0.1 K/UL (ref 0–0.4)
EOSINOPHIL NFR BLD: 1 % (ref 0–5)
ERYTHROCYTE [DISTWIDTH] IN BLOOD BY AUTOMATED COUNT: 13.2 % (ref 11.6–14.5)
GLOBULIN SER CALC-MCNC: 3.8 G/DL
GLUCOSE BLD STRIP.AUTO-MCNC: 130 MG/DL (ref 70–110)
GLUCOSE SERPL-MCNC: 154 MG/DL (ref 70–110)
HCT VFR BLD AUTO: 42.2 % (ref 36–48)
HGB BLD-MCNC: 14.8 G/DL (ref 13–16)
IMM GRANULOCYTES # BLD AUTO: 0 K/UL
IMM GRANULOCYTES NFR BLD AUTO: 0 %
LYMPHOCYTES # BLD: 3.9 K/UL (ref 0.9–3.6)
LYMPHOCYTES NFR BLD: 50 % (ref 21–52)
MCH RBC QN AUTO: 34.5 PG (ref 24–34)
MCHC RBC AUTO-ENTMCNC: 35.1 G/DL (ref 31–37)
MCV RBC AUTO: 98.4 FL (ref 74–97)
MONOCYTES # BLD: 1 K/UL (ref 0.05–1.2)
MONOCYTES NFR BLD: 13 % (ref 3–10)
NEUTS SEG # BLD: 2.8 K/UL (ref 1.8–8)
NEUTS SEG NFR BLD: 36 % (ref 40–73)
P-R INTERVAL, ECG05: 194 MS
PERFORMED BY, TECHID: ABNORMAL
PLATELET # BLD AUTO: 211 K/UL (ref 135–420)
PMV BLD AUTO: 9.5 FL (ref 9.2–11.8)
POTASSIUM SERPL-SCNC: 3.8 MMOL/L (ref 3.2–5.1)
PROT SERPL-MCNC: 7.7 G/DL (ref 6.1–8.4)
Q-T INTERVAL, ECG07: 488 MS
QRS DURATION, ECG06: 145 MS
QTC CALCULATION (BEZET), ECG08: 531 MS
RBC # BLD AUTO: 4.29 M/UL (ref 4.7–5.5)
SODIUM SERPL-SCNC: 134 MMOL/L (ref 135–145)
TROPONIN I SERPL-MCNC: 0.02 NG/ML (ref 0.02–0.05)
VENTRICULAR RATE, ECG03: 71 BPM
WBC # BLD AUTO: 7.8 K/UL (ref 4.6–13.2)

## 2020-11-02 PROCEDURE — 93005 ELECTROCARDIOGRAM TRACING: CPT

## 2020-11-02 PROCEDURE — 70450 CT HEAD/BRAIN W/O DYE: CPT

## 2020-11-02 PROCEDURE — 83036 HEMOGLOBIN GLYCOSYLATED A1C: CPT

## 2020-11-02 PROCEDURE — 85379 FIBRIN DEGRADATION QUANT: CPT

## 2020-11-02 PROCEDURE — 72050 X-RAY EXAM NECK SPINE 4/5VWS: CPT

## 2020-11-02 PROCEDURE — 82607 VITAMIN B-12: CPT

## 2020-11-02 PROCEDURE — 80053 COMPREHEN METABOLIC PANEL: CPT

## 2020-11-02 PROCEDURE — 80061 LIPID PANEL: CPT

## 2020-11-02 PROCEDURE — 72040 X-RAY EXAM NECK SPINE 2-3 VW: CPT

## 2020-11-02 PROCEDURE — 99285 EMERGENCY DEPT VISIT HI MDM: CPT

## 2020-11-02 PROCEDURE — 82962 GLUCOSE BLOOD TEST: CPT

## 2020-11-02 PROCEDURE — 71045 X-RAY EXAM CHEST 1 VIEW: CPT

## 2020-11-02 PROCEDURE — 85025 COMPLETE CBC W/AUTO DIFF WBC: CPT

## 2020-11-02 PROCEDURE — 36415 COLL VENOUS BLD VENIPUNCTURE: CPT

## 2020-11-02 PROCEDURE — 84484 ASSAY OF TROPONIN QUANT: CPT

## 2020-11-02 RX ORDER — AMLODIPINE BESYLATE 2.5 MG/1
TABLET ORAL
Qty: 30 TAB | Refills: 0 | Status: SHIPPED | OUTPATIENT
Start: 2020-11-02 | End: 2021-05-25 | Stop reason: SDUPTHER

## 2020-11-02 NOTE — ED PROVIDER NOTES
EMERGENCY DEPARTMENT HISTORY AND PHYSICAL EXAM      Date: 11/2/2020  Patient Name: Lon Pavon    History of Presenting Illness     Chief Complaint   Patient presents with    Syncope       History Provided By: Patient    HPI: Lon Pavon, 79 y.o. male presents to the ED with complaints of syncope. Pt's phlebotomist states pt was at the ED getting outpatient blood work done when she noticed pt started c/o light-headedness. Phlebotomist notes pt then had a syncopal episode and was unresponsive and convulsing for several minutes, prompting their visit to the ED. Pt denies any current sxs of CP, SOB, or any other sxs. Pt denies a hx of syncope. There are no other complaints, changes, or physical findings at this time. PCP: Christopher Yeung MD    Current Outpatient Medications   Medication Sig Dispense Refill    amLODIPine (NORVASC) 2.5 mg tablet Take 1 tablet by mouth once daily 30 Tab 0    metoprolol succinate (TOPROL-XL) 25 mg XL tablet Take 1 tablet by mouth once daily  Indications: high blood pressure 90 Tab 2    ramipriL (ALTACE) 10 mg capsule TAKE 1 CAPSULE BY MOUTH ONCE DAILY AS DIRECTED FOR 90 DAYS 90 Cap 0    metFORMIN (GLUCOPHAGE) 500 mg tablet Take 1/2 (one-half) tablet by mouth once daily 45 Tab 0    atorvastatin (LIPITOR) 10 mg tablet Take 1 tablet by mouth once daily for 90 days 90 Tab 0    clopidogrel (PLAVIX) 75 mg tab TAKE 1 TABLET BY MOUTH ONCE DAILY 90 Tab 3    red yeast rice extract 600 mg cap Take 600 mg by mouth now.  nitroglycerin (NITROSTAT) 0.4 mg SL tablet 1 Tab by SubLINGual route every five (5) minutes as needed for Chest Pain. 25 Tab 3    aspirin 81 mg chewable tablet Take 81 mg by mouth daily.          Past History     Past Medical History:  Past Medical History:   Diagnosis Date    CAD (coronary artery disease)     Diabetes ([facility] [address]     Essential hypertension     Hyperlipidemia        Past Surgical History:  Past Surgical History:   Procedure Laterality Date    CARDIAC CATHETERIZATION  12/28/2016         CORONARY ARTERY ANGIOGRAM  12/28/2016         CORONARY STENT EA ADDL VESSEL  12/28/2016         CORONARY STENT SINGLE W/PTCA  12/28/2016         HX CORONARY STENT PLACEMENT      HX HEART CATHETERIZATION      LV ANGIOGRAPHY  12/28/2016            Family History:  Family History   Problem Relation Age of Onset    Diabetes Father     Heart Disease Father        Social History:  Social History     Tobacco Use    Smoking status: Never Smoker    Smokeless tobacco: Never Used   Substance Use Topics    Alcohol use: No    Drug use: No       Allergies:  No Known Allergies      Review of Systems   Review of Systems   Constitutional: Negative for chills, fatigue and fever. HENT: Negative for congestion, ear pain and sore throat. Eyes: Negative for pain, redness and visual disturbance. Respiratory: Negative for cough, shortness of breath and wheezing. Cardiovascular: Negative for chest pain and leg swelling. Gastrointestinal: Negative for diarrhea, nausea and vomiting. Endocrine: Negative for polydipsia, polyphagia and polyuria. Genitourinary: Negative for dysuria, frequency and hematuria. Musculoskeletal: Negative for back pain, neck pain and neck stiffness. Skin: Negative for color change and rash. Neurological: Positive for syncope and light-headedness. Negative for dizziness and headaches. Psychiatric/Behavioral: Negative for confusion. The patient is not nervous/anxious. Physical Exam   Physical Exam  Vitals signs and nursing note reviewed. Constitutional:       General: He is awake. Appearance: Normal appearance. He is well-developed, well-groomed and normal weight. HENT:      Head: Normocephalic and atraumatic. Eyes:      Extraocular Movements: Extraocular movements intact. Pupils: Pupils are equal, round, and reactive to light.    Neck:      Musculoskeletal: Full passive range of motion without pain, normal range of motion and neck supple. Cardiovascular:      Rate and Rhythm: Normal rate and regular rhythm. Heart sounds: Normal heart sounds. Pulmonary:      Effort: Pulmonary effort is normal.      Breath sounds: Normal breath sounds and air entry. Abdominal:      General: Abdomen is flat. Palpations: Abdomen is soft. Skin:     General: Skin is warm and dry. Neurological:      General: No focal deficit present. Mental Status: He is alert and oriented to person, place, and time. Psychiatric:         Attention and Perception: Attention and perception normal.         Mood and Affect: Mood and affect normal.         Speech: Speech normal.         Behavior: Behavior normal. Behavior is cooperative. Thought Content: Thought content normal.         Cognition and Memory: Cognition and memory normal.         Judgment: Judgment normal.         Diagnostic Study Results     Labs -     No results found for this or any previous visit (from the past 12 hour(s)). Radiologic Studies -   CT HEAD WO CONT   Final Result   IMPRESSION:      1. No evidence for acute infarct, hemorrhage, or mass effect. CT can be negative   in acute setting. 2. Mild atrophy demonstrating nonspecific white matter hypodensities, likely   chronic microvascular disease. Chronic infarct anterior right corona radiata. XR CHEST PORT   Final Result   Impression:      Right lung base subsegmental atelectasis versus less probable developing   infiltrate. Consider follow-up PA and lateral chest x-ray when clinically   feasible. CT Results  (Last 48 hours)               11/02/20 1210  CT HEAD WO CONT Final result    Impression:  IMPRESSION:       1. No evidence for acute infarct, hemorrhage, or mass effect. CT can be negative   in acute setting. 2. Mild atrophy demonstrating nonspecific white matter hypodensities, likely   chronic microvascular disease. Chronic infarct anterior right corona radiata. Narrative:  CT head without contrast       INDICATION: Headache. COMPARISON: None. TECHNIQUE: Axial CT imaging of the head was performed without intravenous   contrast. One or more dose reduction techniques were used on this CT: automated   exposure control, adjustment of the mAs and/or kVp according to patient size,   and iterative reconstruction techniques. The specific techniques used on this   CT exam have been documented in the patient's electronic medical record. Digital   Imaging and Communications in Medicine (DICOM) format image data are available   to nonaffiliated external healthcare facilities or entities on a secure, media   free, reciprocally searchable basis with patient authorization for at least a   12-month period after this study. _______________       FINDINGS:       BRAIN AND POSTERIOR FOSSA: There is mild diffuse peripheral atrophy with   prominence of the sylvian fissures, the cisterns and the sulci pattern without   midline shift or mass effect. Chronic infarct anterior right corona radiata. There is nonspecific periventricular and subcortical hypodensities, most   probably chronic small vessel ischemia. There is no evidence of any hemorrhage,   acute infarct, or abnormal fluid collection.]       EXTRA-AXIAL SPACES AND MENINGES: There are no abnormal extra-axial fluid   collections. CALVARIUM: Intact. SINUSES: Clear. OTHER: Carotid atherosclerotic calcifications noted. _______________               CXR Results  (Last 48 hours)               11/02/20 1138  XR CHEST PORT Final result    Impression:  Impression:       Right lung base subsegmental atelectasis versus less probable developing   infiltrate. Consider follow-up PA and lateral chest x-ray when clinically   feasible. Narrative:  CHEST AP PORTABLE       Indication: Syncope. Comparison: None.         Findings: Streaky opacity in the right lung base, atelectasis versus less probable developing infiltrate. Remaining lungs appear clear. The cardiac   silhouette and pulmonary vascularity appear within normal limits. No evidence   for pneumothorax or pleural effusion. Medical Decision Making and ED Course   I am the first provider for this patient. I reviewed the vital signs, available nursing notes, past medical history, past surgical history, family history and social history. Vital Signs-Reviewed the patient's vital signs. No data found. EKG interpretation: (Preliminary): performed at 11:01 am  Rhythm: normal sinus rhythm; Rate (approx.): 71; Axis: normal; OR interval: normal; QRS interval: prolonged; ST/T wave: normal; Other findings: LBBB. No STEMI. Records Reviewed: Previous electrocardiograms  Pt's EKG is unchanged compared to EKG performed on 9.15.2020, noting of old LBBB. ED Course:   Initial assessment performed. The patients presenting problems have been discussed, and they are in agreement with the care plan formulated and outlined with them. I have encouraged them to ask questions as they arise throughout their visit. Provider Notes (Medical Decision Making): Uneventful ED course, clinical improvement with therapy, patient will be discharged to followup with PCP as directed      Disposition   Discharged  DISCHARGE PLAN:  1.    Current Discharge Medication List      CONTINUE these medications which have NOT CHANGED    Details   amLODIPine (NORVASC) 2.5 mg tablet Take 1 tablet by mouth once daily  Qty: 30 Tab, Refills: 0      metoprolol succinate (TOPROL-XL) 25 mg XL tablet Take 1 tablet by mouth once daily  Indications: high blood pressure  Qty: 90 Tab, Refills: 2      ramipriL (ALTACE) 10 mg capsule TAKE 1 CAPSULE BY MOUTH ONCE DAILY AS DIRECTED FOR 90 DAYS  Qty: 90 Cap, Refills: 0      metFORMIN (GLUCOPHAGE) 500 mg tablet Take 1/2 (one-half) tablet by mouth once daily  Qty: 45 Tab, Refills: 0      atorvastatin (LIPITOR) 10 mg tablet Take 1 tablet by mouth once daily for 90 days  Qty: 90 Tab, Refills: 0      clopidogrel (PLAVIX) 75 mg tab TAKE 1 TABLET BY MOUTH ONCE DAILY  Qty: 90 Tab, Refills: 3      red yeast rice extract 600 mg cap Take 600 mg by mouth now. nitroglycerin (NITROSTAT) 0.4 mg SL tablet 1 Tab by SubLINGual route every five (5) minutes as needed for Chest Pain. Qty: 25 Tab, Refills: 3      aspirin 81 mg chewable tablet Take 81 mg by mouth daily. 2.   Follow-up Information     Follow up With Specialties Details Why Diya Beatty MD Family Medicine In 2 days  [address]  [phone]          3. Return to ED if worse     Diagnosis     Clinical Impression:   1. Vasovagal syncope        Attestations:    By signing my name below, Val Zacarias, attelvin that this documentation has been prepared under the direction and in presence of Dr. Sandra Foy on 11/03/20. Electronically signed: Dami Gonzalez, 11/03/20, 11:18 AM    Please note that this dictation was completed with [employer], the [employer] voice recognition software. Quite often unanticipated grammatical, syntax, homophones, and other interpretive errors are inadvertently transcribed by the computer software. Please disregard these errors. Please excuse any errors that have escaped final proofreading. Thank you.

## 2020-11-02 NOTE — ED TRIAGE NOTES
Pt was in outpatient lab for a lab draw, as  was about to stick him, pt passed out. [facility] was called and upon arrival to pt, staff found pt clammy, slightly unresponsive, hypotensive.    Upon arrival to ED, pt is responsive and oriented x3

## 2020-11-02 NOTE — DISCHARGE INSTRUCTIONS
Take medicines as prescribed and follow-up with your PCP in 2 to 3 days. Turn to the emergency room for any new or worsening symptoms.

## 2020-11-03 LAB
CHOLEST SERPL-MCNC: 147 MG/DL
EST. AVERAGE GLUCOSE BLD GHB EST-MCNC: 120 MG/DL
HBA1C MFR BLD: 5.8 % (ref 4–5.6)
HDLC SERPL-MCNC: 35 MG/DL
HDLC SERPL: 4.2 {RATIO} (ref 0–5)
LDLC SERPL CALC-MCNC: 77.2 MG/DL (ref 0–100)
LIPID PROFILE,FLP: ABNORMAL
TRIGL SERPL-MCNC: 174 MG/DL (ref ?–150)
VIT B12 SERPL-MCNC: 598 PG/ML (ref 193–986)
VLDLC SERPL CALC-MCNC: 34.8 MG/DL

## 2020-11-07 ENCOUNTER — TELEPHONE (OUTPATIENT)
Dept: FAMILY MEDICINE CLINIC | Age: 80
End: 2020-11-07

## 2020-11-08 ENCOUNTER — TELEPHONE (OUTPATIENT)
Dept: FAMILY MEDICINE CLINIC | Age: 80
End: 2020-11-08

## 2020-11-08 NOTE — TELEPHONE ENCOUNTER
Patient. He has extensive cervical disc disease.   Thinks is not bothering him much I recommended heat anti-inflammatories if anything changes he will need to let us know immediately

## 2020-11-27 RX ORDER — CLOPIDOGREL BISULFATE 75 MG/1
TABLET ORAL
Qty: 90 TAB | Refills: 0 | Status: SHIPPED | OUTPATIENT
Start: 2020-11-27 | End: 2021-11-19

## 2020-12-01 RX ORDER — ATORVASTATIN CALCIUM 10 MG/1
TABLET, FILM COATED ORAL
Qty: 90 TAB | Refills: 0 | Status: SHIPPED | OUTPATIENT
Start: 2020-12-01 | End: 2021-03-12

## 2020-12-14 RX ORDER — RAMIPRIL 10 MG/1
CAPSULE ORAL
Qty: 90 CAP | Refills: 0 | Status: SHIPPED | OUTPATIENT
Start: 2020-12-14 | End: 2021-03-21

## 2021-03-12 RX ORDER — ATORVASTATIN CALCIUM 10 MG/1
TABLET, FILM COATED ORAL
Qty: 90 TAB | Refills: 0 | Status: SHIPPED | OUTPATIENT
Start: 2021-03-12 | End: 2021-06-21 | Stop reason: SDUPTHER

## 2021-03-21 RX ORDER — RAMIPRIL 10 MG/1
CAPSULE ORAL
Qty: 90 CAP | Refills: 0 | Status: SHIPPED | OUTPATIENT
Start: 2021-03-21 | End: 2021-06-21 | Stop reason: SDUPTHER

## 2021-05-25 ENCOUNTER — OFFICE VISIT (OUTPATIENT)
Dept: CARDIOLOGY CLINIC | Age: 81
End: 2021-05-25
Payer: MEDICARE

## 2021-05-25 VITALS
HEIGHT: 68 IN | OXYGEN SATURATION: 97 % | DIASTOLIC BLOOD PRESSURE: 70 MMHG | SYSTOLIC BLOOD PRESSURE: 122 MMHG | WEIGHT: 169 LBS | BODY MASS INDEX: 25.61 KG/M2 | HEART RATE: 79 BPM

## 2021-05-25 DIAGNOSIS — R07.9 CHEST PAIN, UNSPECIFIED TYPE: ICD-10-CM

## 2021-05-25 DIAGNOSIS — I25.10 CORONARY ARTERY DISEASE INVOLVING NATIVE CORONARY ARTERY OF NATIVE HEART WITHOUT ANGINA PECTORIS: Primary | ICD-10-CM

## 2021-05-25 PROCEDURE — G8536 NO DOC ELDER MAL SCRN: HCPCS | Performed by: INTERNAL MEDICINE

## 2021-05-25 PROCEDURE — G8754 DIAS BP LESS 90: HCPCS | Performed by: INTERNAL MEDICINE

## 2021-05-25 PROCEDURE — 1101F PT FALLS ASSESS-DOCD LE1/YR: CPT | Performed by: INTERNAL MEDICINE

## 2021-05-25 PROCEDURE — 93000 ELECTROCARDIOGRAM COMPLETE: CPT | Performed by: INTERNAL MEDICINE

## 2021-05-25 PROCEDURE — G8427 DOCREV CUR MEDS BY ELIG CLIN: HCPCS | Performed by: INTERNAL MEDICINE

## 2021-05-25 PROCEDURE — G8752 SYS BP LESS 140: HCPCS | Performed by: INTERNAL MEDICINE

## 2021-05-25 PROCEDURE — 99214 OFFICE O/P EST MOD 30 MIN: CPT | Performed by: INTERNAL MEDICINE

## 2021-05-25 PROCEDURE — G8510 SCR DEP NEG, NO PLAN REQD: HCPCS | Performed by: INTERNAL MEDICINE

## 2021-05-25 PROCEDURE — G8419 CALC BMI OUT NRM PARAM NOF/U: HCPCS | Performed by: INTERNAL MEDICINE

## 2021-05-25 RX ORDER — AMLODIPINE BESYLATE 2.5 MG/1
TABLET ORAL
Qty: 90 TABLET | Refills: 3 | Status: SHIPPED | OUTPATIENT
Start: 2021-05-25 | End: 2021-12-30

## 2021-05-25 NOTE — PROGRESS NOTES
Aisha Leon presents today for   Chief Complaint   Patient presents with    Follow-up     8 month f/u       Aisha Leon preferred language for health care discussion is english/other. Is someone accompanying this pt? no    Is the patient using any DME equipment during [address]? no    Depression Screening:  3 most recent PHQ Screens 5/25/2021   Little interest or pleasure in doing things Not at all   Feeling down, depressed, irritable, or hopeless Not at all   Total Score PHQ 2 0       Learning Assessment:  Learning Assessment 10/30/2020   PRIMARY LEARNER Patient   PRIMARY LANGUAGE ENGLISH   LEARNER PREFERENCE PRIMARY VIDEOS   ANSWERED BY patient   RELATIONSHIP SELF       Abuse Screening:  Abuse Screening Questionnaire 5/25/2021   Do you ever feel afraid of your partner? N   Are you in a relationship with someone who physically or mentally threatens you? N   Is it safe for you to go home? Y       Fall Risk  Fall Risk Assessment, last 12 mths 5/25/2021   Able to walk? Yes   Fall in past 12 months? 0   Do you feel unsteady? 0   Are you worried about falling 0       Pt currently taking Anticoagulant therapy? Plavix 75mg    Coordination of Care:  1. Have you been to the ER, urgent care clinic since your last visit? Hospitalized since your last visit? no    2. Have you seen or consulted any other health care providers outside of the [address] [place] [address] since your last visit? Include any pap smears or colon screening.  no

## 2021-05-25 NOTE — PROGRESS NOTES
HISTORY OF PRESENT ILLNESS  Diane Reynolds is a 79 y.o. male. ASSESSMENT and PLAN    Mr. Diane Reynolds has history of CAD.  Around 2005, he had 4 stents placed at [facility].  In December 2016, he presented to [facility] with increased exertional chest pains for several months. Reid Grossman was diagnosed with ACS. Reid Grossman subsequently underwent angioplasty and stent of proximal RCA 90% ISR residual 0% using DERIAN.  He also had focal LAD 90% ISR stented to 0%. He has MIRNA. He does have a CPAP machine. Between 2019 and 2021, he did not wear CPAP machine. He had been on Mary Ellen Guernsey, this was switched to Plavix as he has had frequent epistaxis. Reid Grossman has also noticed some blood on the toilet paper. Juarez Darlene will defer further evaluation of rectal blood to the PCP. He self discontinued Plavix February 2021. He presented to the emergency room with chest heaviness in November 2020. He was evaluated and discharged home.  CAD:    He still has episodes of chest heaviness with physical exertion. We have lengthy discussion about his sleep apnea, and possible pulmonary hypertension. He will resume wearing his CPAP machine. For completeness, with his known history of CAD, I have requested exercise nuclear scan. Echocardiogram has been requested to reassess his pulmonary hypertension.  BP:    Well controlled.  Rhythm:    Stable sinus.  CHF:    There is no evidence of decompensated CHF noted.  Weight:    His weight today is 169 pounds. This is his baseline weight.  Cholesterol:  Target LDL<70. Lipitor 10.  Anticoagulant:  Remains on ASA. If the above testing is unremarkable, I will see him back in 6 months.  Thank you. Encounter Diagnoses   Name Primary?     Coronary artery disease involving native coronary artery of native heart without angina pectoris Yes    Chest pain, unspecified type      current treatment plan is effective, no change in therapy  lab results and schedule of future lab studies reviewed with patient  reviewed diet, exercise and weight control  cardiovascular risk and specific lipid/LDL goals reviewed  use of aspirin to prevent MI and TIA's discussed      HPI   Today, Mr. Gala Carranza has no complaints of chest pains at rest.  With physical exertion, he occasionally has chest tightness. He was evaluated in the emergency room in November 2020 and was felt to be unremarkable and was discharged to home. He remains active physically. He works for the hospital system as appropriate. He has not had any episodes of significant chest pains with physical exertion. He denies any orthopnea or PND. He denies any palpitations or dizziness. Review of Systems   Respiratory: Negative for shortness of breath. Cardiovascular: Positive for chest pain. Negative for palpitations, orthopnea, claudication, leg swelling and PND. All other systems reviewed and are negative. Physical Exam  Vitals and nursing note reviewed. Constitutional:       Appearance: Normal appearance. HENT:      Head: Normocephalic. Eyes:      Conjunctiva/sclera: Conjunctivae normal.   Neck:      Vascular: No carotid bruit. Cardiovascular:      Rate and Rhythm: Normal rate and regular rhythm. Pulmonary:      Breath sounds: Normal breath sounds. Abdominal:      Palpations: Abdomen is soft. Musculoskeletal:         General: No swelling. Cervical back: No rigidity. Skin:     General: Skin is warm and dry. Neurological:      General: No focal deficit present. Mental Status: He is alert and oriented to person, place, and time.    Psychiatric:         Mood and Affect: Mood normal.         Behavior: Behavior normal.         PCP: Luther Kendall MD    Past Medical History:   Diagnosis Date    CAD (coronary artery disease)     Diabetes ([facility] [address]     Essential hypertension     Hyperlipidemia        Past Surgical History:   Procedure Laterality Date    CARDIAC CATHETERIZATION 12/28/2016         CORONARY ARTERY ANGIOGRAM  12/28/2016         CORONARY STENT EA ADDL VESSEL  12/28/2016         CORONARY STENT SINGLE W/PTCA  12/28/2016         HX CORONARY STENT PLACEMENT      HX HEART CATHETERIZATION      LV ANGIOGRAPHY  12/28/2016            Current Outpatient Medications   Medication Sig Dispense Refill    amLODIPine (NORVASC) 2.5 mg tablet Take 1 tablet by mouth once daily 90 Tablet 3    ramipriL (ALTACE) 10 mg capsule TAKE 1 CAPSULE BY MOUTH ONCE DAILY AS DIRECTED 90 Cap 0    atorvastatin (LIPITOR) 10 mg tablet Take 1 tablet by mouth once daily 90 Tab 0    clopidogreL (PLAVIX) 75 mg tab Take 1 tablet by mouth once daily 90 Tab 0    metoprolol succinate (TOPROL-XL) 25 mg XL tablet Take 1 tablet by mouth once daily  Indications: high blood pressure 90 Tab 2    metFORMIN (GLUCOPHAGE) 500 mg tablet Take 1/2 (one-half) tablet by mouth once daily 45 Tab 0    red yeast rice extract 600 mg cap Take 600 mg by mouth now.  nitroglycerin (NITROSTAT) 0.4 mg SL tablet 1 Tab by SubLINGual route every five (5) minutes as needed for Chest Pain. 25 Tab 3    aspirin 81 mg chewable tablet Take 81 mg by mouth daily.          The patient has a family history of    Social History     Tobacco Use    Smoking status: Never Smoker    Smokeless tobacco: Never Used   Substance Use Topics    Alcohol use: No    Drug use: No       Lab Results   Component Value Date/Time    Cholesterol, total 147 11/02/2020 10:41 AM    HDL Cholesterol 35 11/02/2020 10:41 AM    LDL, calculated 77.2 11/02/2020 10:41 AM    Triglyceride 174 (H) 11/02/2020 10:41 AM    CHOL/HDL Ratio 4.2 11/02/2020 10:41 AM        BP Readings from Last 3 Encounters:   05/25/21 122/70   11/02/20 129/82   09/15/20 130/80        Pulse Readings from Last 3 Encounters:   05/25/21 79   11/02/20 67   07/12/19 73       Wt Readings from Last 3 Encounters:   05/25/21 76.7 kg (169 lb)   11/02/20 74.8 kg (165 lb)   10/30/20 75.3 kg (166 lb) EKG: unchanged from previous tracings, normal sinus rhythm, LBBB.

## 2021-06-21 ENCOUNTER — OFFICE VISIT (OUTPATIENT)
Dept: FAMILY MEDICINE CLINIC | Age: 81
End: 2021-06-21
Payer: MEDICARE

## 2021-06-21 VITALS — SYSTOLIC BLOOD PRESSURE: 139 MMHG | OXYGEN SATURATION: 94 % | HEART RATE: 86 BPM | DIASTOLIC BLOOD PRESSURE: 82 MMHG

## 2021-06-21 DIAGNOSIS — Z13.31 SCREENING FOR DEPRESSION: ICD-10-CM

## 2021-06-21 DIAGNOSIS — I10 ESSENTIAL HYPERTENSION: ICD-10-CM

## 2021-06-21 DIAGNOSIS — Z13.39 SCREENING FOR ALCOHOLISM: ICD-10-CM

## 2021-06-21 DIAGNOSIS — Z00.00 MEDICARE ANNUAL WELLNESS VISIT, SUBSEQUENT: ICD-10-CM

## 2021-06-21 DIAGNOSIS — I25.10 CORONARY ARTERY DISEASE INVOLVING NATIVE CORONARY ARTERY OF NATIVE HEART WITHOUT ANGINA PECTORIS: ICD-10-CM

## 2021-06-21 DIAGNOSIS — Z11.59 ENCOUNTER FOR HEPATITIS C SCREENING TEST FOR LOW RISK PATIENT: ICD-10-CM

## 2021-06-21 DIAGNOSIS — N40.0 PROSTATE ENLARGEMENT: ICD-10-CM

## 2021-06-21 DIAGNOSIS — E11.8 CONTROLLED TYPE 2 DIABETES MELLITUS WITH COMPLICATION, WITHOUT LONG-TERM CURRENT USE OF INSULIN (HCC): Primary | ICD-10-CM

## 2021-06-21 DIAGNOSIS — E11.8 CONTROLLED TYPE 2 DIABETES MELLITUS WITH COMPLICATION, WITHOUT LONG-TERM CURRENT USE OF INSULIN (HCC): ICD-10-CM

## 2021-06-21 LAB — HBA1C MFR BLD HPLC: 5.8 %

## 2021-06-21 PROCEDURE — G8752 SYS BP LESS 140: HCPCS | Performed by: FAMILY MEDICINE

## 2021-06-21 PROCEDURE — 99213 OFFICE O/P EST LOW 20 MIN: CPT | Performed by: FAMILY MEDICINE

## 2021-06-21 PROCEDURE — 1101F PT FALLS ASSESS-DOCD LE1/YR: CPT | Performed by: FAMILY MEDICINE

## 2021-06-21 PROCEDURE — G8536 NO DOC ELDER MAL SCRN: HCPCS | Performed by: FAMILY MEDICINE

## 2021-06-21 PROCEDURE — 83036 HEMOGLOBIN GLYCOSYLATED A1C: CPT | Performed by: FAMILY MEDICINE

## 2021-06-21 PROCEDURE — G8754 DIAS BP LESS 90: HCPCS | Performed by: FAMILY MEDICINE

## 2021-06-21 PROCEDURE — G0439 PPPS, SUBSEQ VISIT: HCPCS | Performed by: FAMILY MEDICINE

## 2021-06-21 PROCEDURE — G8427 DOCREV CUR MEDS BY ELIG CLIN: HCPCS | Performed by: FAMILY MEDICINE

## 2021-06-21 PROCEDURE — G8419 CALC BMI OUT NRM PARAM NOF/U: HCPCS | Performed by: FAMILY MEDICINE

## 2021-06-21 PROCEDURE — G8510 SCR DEP NEG, NO PLAN REQD: HCPCS | Performed by: FAMILY MEDICINE

## 2021-06-21 RX ORDER — RAMIPRIL 10 MG/1
CAPSULE ORAL
Qty: 90 CAPSULE | Refills: 3 | Status: SHIPPED | OUTPATIENT
Start: 2021-06-21 | End: 2021-12-30

## 2021-06-21 RX ORDER — ATORVASTATIN CALCIUM 10 MG/1
TABLET, FILM COATED ORAL
Qty: 90 TABLET | Refills: 3 | Status: SHIPPED | OUTPATIENT
Start: 2021-06-21

## 2021-06-21 RX ORDER — METFORMIN HYDROCHLORIDE 500 MG/1
TABLET ORAL
Qty: 45 TABLET | Refills: 0 | Status: SHIPPED | OUTPATIENT
Start: 2021-06-21 | End: 2022-02-10

## 2021-06-21 RX ORDER — METOPROLOL SUCCINATE 25 MG/1
TABLET, EXTENDED RELEASE ORAL
Qty: 90 TABLET | Refills: 2 | Status: SHIPPED | OUTPATIENT
Start: 2021-06-21 | End: 2021-12-30

## 2021-06-21 NOTE — PROGRESS NOTES
This is the Subsequent Medicare Annual Wellness Exam, performed 12 months or more after the Initial AWV or the last Subsequent AWV    I have reviewed the patient's medical history in detail and updated the computerized patient record. Assessment/Plan   Education and counseling provided:  Are appropriate based on today's review and evaluation    1. Medicare annual wellness visit, subsequent  2. Screening for alcoholism  -     MT ANNUAL ALCOHOL SCREEN 15 MIN  3. Screening for depression  -     DEPRESSION SCREEN ANNUAL       Depression Risk Factor Screening:     3 most recent PHQ Screens 6/21/2021   Little interest or pleasure in doing things Not at all   Feeling down, depressed, irritable, or hopeless Not at all   Total Score PHQ 2 0       Alcohol Risk Screen    Do you average more than 1 drink per night or more than 7 drinks a week: No    In the past three months have you have had more than 4 drinks containing alcohol on one occasion: No        Functional Ability and Level of Safety:    Hearing: Hearing is good. Activities of Daily Living: The home contains: no safety equipment. Patient does total self care      Ambulation: with no difficulty     Fall Risk:  Fall Risk Assessment, last 12 mths 6/21/2021   Able to walk? Yes   Fall in past 12 months? 1   Do you feel unsteady? 0   Are you worried about falling 0   Number of falls in past 12 months 1   Fall with injury?  0      Abuse Screen:  Patient is not abused       Cognitive Screening    Has your family/caregiver stated any concerns about your memory: no    Cognitive Screening: Normal - Mini Cog Test    Health Maintenance Due     Health Maintenance Due   Topic Date Due    Hepatitis C Screening  Never done    Foot Exam Q1  Never done    MICROALBUMIN Q1  Never done    Eye Exam Retinal or Dilated  Never done    COVID-19 Vaccine (1) Never done    DTaP/Tdap/Td series (1 - Tdap) Never done    Shingrix Vaccine Age 50> (1 of 2) Never done   24 \[facility] [place] [facility]\"" Pneumococcal 65+ years (1 of 1 - PPSV23) Never done       Patient Care Team   Patient Care Team:  Eloisa Simental MD as PCP - General (Family Medicine)  Eloisa Simental MD as PCP - [address] [place] [address] Provider  Marybeth Levy MD (Cardiology)    History     Patient Active Problem List   Diagnosis Code    Coronary artery disease involving native coronary artery without angina pectoris I25.10    Essential hypertension I10    Dyslipidemia E78.5    Controlled type 2 diabetes mellitus with complication, without long-term current use of insulin ([address] E11.8    Paresthesias in right hand R20.2     Past Medical History:   Diagnosis Date    CAD (coronary artery disease)     Diabetes ([address]     Essential hypertension     Hyperlipidemia       Past Surgical History:   Procedure Laterality Date    CARDIAC CATHETERIZATION  12/28/2016         CORONARY ARTERY ANGIOGRAM  12/28/2016         CORONARY STENT EA ADDL VESSEL  12/28/2016         CORONARY STENT SINGLE W/PTCA  12/28/2016         HX CORONARY STENT PLACEMENT      HX HEART CATHETERIZATION      LV ANGIOGRAPHY  12/28/2016          Current Outpatient Medications   Medication Sig Dispense Refill    amLODIPine (NORVASC) 2.5 mg tablet Take 1 tablet by mouth once daily 90 Tablet 3    ramipriL (ALTACE) 10 mg capsule TAKE 1 CAPSULE BY MOUTH ONCE DAILY AS DIRECTED 90 Cap 0    atorvastatin (LIPITOR) 10 mg tablet Take 1 tablet by mouth once daily 90 Tab 0    clopidogreL (PLAVIX) 75 mg tab Take 1 tablet by mouth once daily 90 Tab 0    metoprolol succinate (TOPROL-XL) 25 mg XL tablet Take 1 tablet by mouth once daily  Indications: high blood pressure 90 Tab 2    metFORMIN (GLUCOPHAGE) 500 mg tablet Take 1/2 (one-half) tablet by mouth once daily 45 Tab 0    red yeast rice extract 600 mg cap Take 600 mg by mouth now.  nitroglycerin (NITROSTAT) 0.4 mg SL tablet 1 Tab by SubLINGual route every five (5) minutes as needed for Chest Pain.  25 Tab 3    aspirin 81 mg chewable tablet Take 81 mg by mouth daily. No Known Allergies    Family History   Problem Relation Age of Onset    Diabetes Father     Heart Disease Father      Social History     Tobacco Use    Smoking status: Never Smoker    Smokeless tobacco: Never Used   Substance Use Topics    Alcohol use: No       Juan Rae, who was evaluated through [facility] office visit and is aware that it is a billable service, with coverage as determined by his insurance carrier. He provided verbal consent to proceed: Yes, and patient identification was verified. It was conducted pursuant to the emergency declaration under the [address] [place] [address], [address] [place] [address] authority and the [employer] General Act. A caregiver was present when appropriate. Ability to conduct physical exam was limited. I was in the office. The patient was in the office.     [place]

## 2021-06-21 NOTE — PATIENT INSTRUCTIONS
Medicare Wellness Visit, Male The best way to live healthy is to have a lifestyle where you eat a well-balanced diet, exercise regularly, limit alcohol use, and quit all forms of tobacco/nicotine, if applicable. Regular preventive services are another way to keep healthy. Preventive services (vaccines, screening tests, monitoring & exams) can help personalize your care plan, which helps you manage your own care. Screening tests can find health problems at the earliest stages, when they are easiest to treat. [facility] follows the current, evidence-based guidelines published by the [facility]anna ([facility]) when recommending preventive services for our patients. Because we follow these guidelines, sometimes recommendations change over time as research supports it. (For example, a prostate screening blood test is no longer routinely recommended for men with no symptoms). Of course, you and your doctor may decide to screen more often for some diseases, based on your risk and co-morbidities (chronic disease you are already diagnosed with). Preventive services for you include: - Medicare offers their members a free annual wellness visit, which is time for you and your primary care provider to discuss and plan for your preventive service needs. Take advantage of this benefit every year! 
-All adults over age 72 should receive the recommended pneumonia vaccines. Current USPSTF guidelines recommend a series of two vaccines for the best pneumonia protection.  
-All adults should have a flu vaccine yearly and tetanus vaccine every 10 years. 
-All adults age 48 and older should receive the shingles vaccines (series of two vaccines).       
-All adults age 38-68 who are overweight should have a diabetes screening test once every three years.  
-Other screening tests & preventive services for persons with diabetes include: an eye exam to screen for diabetic retinopathy, a kidney function test, a foot exam, and stricter control over your cholesterol.  
-Cardiovascular screening for adults with routine risk involves an electrocardiogram (ECG) at intervals determined by the provider.  
-Colorectal cancer screening should be done for adults age 54-65 with no increased risk factors for colorectal cancer. There are a number of acceptable methods of screening for this type of cancer. Each test has its own benefits and drawbacks. Discuss with your provider what is most appropriate for you during your annual wellness visit. The different tests include: colonoscopy (considered the best screening method), a fecal occult blood test, a fecal DNA test, and sigmoidoscopy. 
-All adults born between [facility] should be screened once for Hepatitis C. 
-An Abdominal Aortic Aneurysm (AAA) Screening is recommended for men age 73-68 who has ever smoked in their lifetime. Here is a list of your current Health Maintenance items (your personalized list of preventive services) with a due date: 
Health Maintenance Due Topic Date Due  
 Hepatitis C Test  Never done  Diabetic Foot Care  Never done  Albumin Urine Test  Never done  Eye Exam  Never done  COVID-19 Vaccine (1) Never done  DTaP/Tdap/Td  (1 - Tdap) Never done  Shingles Vaccine (1 of 2) Never done  Pneumococcal Vaccine (1 of 1 - PPSV23) Never done

## 2021-06-22 NOTE — PROGRESS NOTES
Subjective: Hakeem Kennedy is a 79 y.o. male who was seen for Annual Wellness Visit (medicare wellness )    HPI patient is a 71-year-old male who is seen for Medicare wellness exam.  He has no nausea vomiting or diarrhea. He is followed by cardiology they have not changed his medications recently his blood pressures have been appropriate he has not checked his blood sugar. He has had no chest pain or shortness of breath. He has been eating regularly. Nobody at home has been sick no chest pain or shortness of breath. No rash no syncope or loss of consciousness. His bowel movements have been appropriate his colonoscopy is apparently up-to-date. He denies any anxiety or depression we last checked his prostate several years ago. He has no specific complaints he wakes occasionally at night to void he is  in a relatively stable relationship. He has been  for many years he is still working on a part-time basis. No falls or injuries he sleeps well at night. He is to be checked for hepatitis C. He does check his blood pressures at home they have been normal.    Home Medications    Medication Sig Start Date End Date Taking? Authorizing Provider   atorvastatin (LIPITOR) 10 mg tablet Take 1 tablet by mouth once daily 6/21/21  Yes Romain Benavides MD   metFORMIN (GLUCOPHAGE) 500 mg tablet Take 1/2 (one-half) tablet by mouth once daily 6/21/21  Yes Romain Benavides MD   metoprolol succinate (TOPROL-XL) 25 mg XL tablet Take 1 tablet by mouth once daily  Indications: high blood pressure 6/21/21  Yes Romain Benavides MD   ramipriL (ALTACE) 10 mg capsule TAKE 1 CAPSULE BY MOUTH ONCE DAILY AS DIRECTED 6/21/21  Yes Romain Benavides MD   amLODIPine (NORVASC) 2.5 mg tablet Take 1 tablet by mouth once daily 5/25/21  Yes Og Romero MD   clopidogreL (PLAVIX) 75 mg tab Take 1 tablet by mouth once daily 11/27/20  Yes Denise Baez MD   red yeast rice extract 600 mg cap Take 600 mg by mouth now.    Yes Provider, Historical   nitroglycerin (NITROSTAT) 0.4 mg SL tablet 1 Tab by SubLINGual route every five (5) minutes as needed for Chest Pain. 1/30/17  Yes Courtney Leticiatova P, NP   aspirin 81 mg chewable tablet Take 81 mg by mouth daily. Yes Provider, Historical      No Known Allergies  Social History     Tobacco Use    Smoking status: Never Smoker    Smokeless tobacco: Never Used   Substance Use Topics    Alcohol use: No    Drug use: No        Patient Active Problem List   Diagnosis Code    Coronary artery disease involving native coronary artery without angina pectoris I25.10    Essential hypertension I10    Dyslipidemia E78.5    Controlled type 2 diabetes mellitus with complication, without long-term current use of insulin (HCC) E11.8    Paresthesias in right hand R20.2    Prostate enlargement N40.0    Encounter for hepatitis C screening test for low risk patient Z11.59       Review of Systems   Constitutional: Negative. HENT: Negative. Eyes: Negative. Respiratory: Negative. Cardiovascular: Negative. Gastrointestinal: Negative. Endocrine: Negative. Genitourinary: Negative. Musculoskeletal: Positive for arthralgias and back pain. Allergic/Immunologic: Negative. Hematological: Negative. Psychiatric/Behavioral: Negative. Physical Exam   Objective:     Visit Vitals  /82   Pulse 86   SpO2 94%      General: alert, cooperative, no distress   Mental  status: normal mood, behavior, speech, dress, motor activity, and thought processes, able to follow commands   HENT: NCAT   Neck: no visualized mass   Resp: no respiratory distress   Neuro: no gross deficits   Skin: no discoloration or lesions of concern on visible areas   Psychiatric: normal affect, consistent with stated mood, no evidence of hallucinations     Blood pressure is appropriate. He has known coronary artery disease but is asymptomatic. The lungs are clear he has a regular rate and rhythm. The abdomen is soft extremities are clear he is in no significant distress. He has generalized osteoarthritis. There is no edema. Prostate is minimally enlarged but there is no nodularity. Assessment & Plan:     Care wellness exam diabetes mellitus hypertension hyperlipidemia known coronary artery disease followed by cardiology screening for hepatitis C: The patient seems medically stable his blood pressure is appropriate it is time to get labs for evaluation of renal function kidney function and liver function as well as lipids. I hepatitis C will be ordered a PSA ordered as well we will follow him in 6 months or sooner if needed he will continue to be seen by cardiology. I spent at least 23 minutes on this visit with this established patient. [id number] [phone] [id number]    Additional exam findings: We discussed the expected course, resolution and complications of the diagnosis(es) in detail. Medication risks, benefits, costs, interactions, and alternatives were discussed as indicated. I advised him to contact the office if his condition worsens, changes or fails to improve as anticipated. He expressed understanding with the diagnosis(es) and plan.

## 2021-06-25 ENCOUNTER — HOSPITAL ENCOUNTER (OUTPATIENT)
Dept: LAB | Age: 81
Discharge: HOME | End: 2021-06-25
Payer: MEDICARE

## 2021-06-25 PROCEDURE — 82570 ASSAY OF URINE CREATININE: CPT

## 2021-06-25 PROCEDURE — 36415 COLL VENOUS BLD VENIPUNCTURE: CPT

## 2021-06-25 PROCEDURE — 86803 HEPATITIS C AB TEST: CPT

## 2021-06-25 PROCEDURE — 84153 ASSAY OF PSA TOTAL: CPT

## 2021-06-26 LAB
CREAT UR-MCNC: 40.2 MG/DL
HCV AB SER IA-ACNC: 0.02 INDEX
HCV AB SERPL QL IA: NONREACTIVE
HCV COMMENT,HCGAC: NORMAL
MICROALBUMIN UR-MCNC: 0.61 MG/DL
MICROALBUMIN/CREAT UR-RTO: 15 MGMALB/GCRE (ref 0–30)
PSA SERPL-MCNC: 0.8 NG/ML (ref 0.01–4)

## 2021-07-01 ENCOUNTER — TELEPHONE (OUTPATIENT)
Dept: CARDIOLOGY CLINIC | Age: 81
End: 2021-07-01

## 2021-08-27 ENCOUNTER — TELEPHONE (OUTPATIENT)
Dept: FAMILY MEDICINE CLINIC | Age: 81
End: 2021-08-27

## 2021-08-27 DIAGNOSIS — L02.91 ABSCESS: Primary | ICD-10-CM

## 2021-08-27 RX ORDER — CEPHALEXIN 500 MG/1
500 CAPSULE ORAL 4 TIMES DAILY
Qty: 40 CAPSULE | Refills: 0 | Status: SHIPPED | OUTPATIENT
Start: 2021-08-27 | End: 2021-09-06

## 2021-11-19 ENCOUNTER — OFFICE VISIT (OUTPATIENT)
Dept: CARDIOLOGY CLINIC | Age: 81
End: 2021-11-19
Payer: MEDICARE

## 2021-11-19 VITALS
BODY MASS INDEX: 24.55 KG/M2 | SYSTOLIC BLOOD PRESSURE: 132 MMHG | OXYGEN SATURATION: 98 % | WEIGHT: 162 LBS | DIASTOLIC BLOOD PRESSURE: 72 MMHG | HEART RATE: 75 BPM | HEIGHT: 68 IN

## 2021-11-19 DIAGNOSIS — I25.10 CORONARY ARTERY DISEASE INVOLVING NATIVE CORONARY ARTERY OF NATIVE HEART WITHOUT ANGINA PECTORIS: Primary | ICD-10-CM

## 2021-11-19 PROCEDURE — G8752 SYS BP LESS 140: HCPCS | Performed by: INTERNAL MEDICINE

## 2021-11-19 PROCEDURE — G8427 DOCREV CUR MEDS BY ELIG CLIN: HCPCS | Performed by: INTERNAL MEDICINE

## 2021-11-19 PROCEDURE — G8536 NO DOC ELDER MAL SCRN: HCPCS | Performed by: INTERNAL MEDICINE

## 2021-11-19 PROCEDURE — 99214 OFFICE O/P EST MOD 30 MIN: CPT | Performed by: INTERNAL MEDICINE

## 2021-11-19 PROCEDURE — G8754 DIAS BP LESS 90: HCPCS | Performed by: INTERNAL MEDICINE

## 2021-11-19 PROCEDURE — G8510 SCR DEP NEG, NO PLAN REQD: HCPCS | Performed by: INTERNAL MEDICINE

## 2021-11-19 PROCEDURE — 1101F PT FALLS ASSESS-DOCD LE1/YR: CPT | Performed by: INTERNAL MEDICINE

## 2021-11-19 PROCEDURE — G8420 CALC BMI NORM PARAMETERS: HCPCS | Performed by: INTERNAL MEDICINE

## 2021-11-19 PROCEDURE — 93000 ELECTROCARDIOGRAM COMPLETE: CPT | Performed by: INTERNAL MEDICINE

## 2021-11-19 NOTE — PROGRESS NOTES
HISTORY OF PRESENT ILLNESS  Tom Seals is a [de-identified] y.o. male. ASSESSMENT and PLAN    Mr. Tom Seals has history of CAD.  Around 2005, he had 4 stents placed at [facility].  In December 2016, he presented to [facility] with increased exertional chest pains for several months. Harrison Rivas was diagnosed with ACS. Harrison Rivas subsequently underwent angioplasty and stent of proximal RCA 90% ISR residual 0% using DERIAN.  He also had focal LAD 90% ISR stented to 0%. He has MIRNA. He does have a CPAP machine. Between 2019 and 2021, he did not wear CPAP machine. He had been on [address], this was switched to Plavix as he has had frequent epistaxis. Harrison Rivas has also noticed some blood on the toilet paper. Florentino Gardner will defer further evaluation of rectal blood to the PCP.   He self discontinued Plavix February 2021. He presented to the emergency room with chest heaviness in November 2020. He was evaluated and discharged home. His echocardiogram done July 2021 revealed EF 55-60%. His nuclear scan revealed probably normal finding with EF 64%. He had small fixed inferior defect which was felt to be diaphragmatic attenuation. Continued medical therapy was recommended. · CAD:    Symptomatically stable. · BP:    Well controlled. · Rhythm:    Stable sinus. · CHF:    There is no evidence of decompensated CHF noted. · Weight:     His weight today is 162 pounds. His baseline weight is 169 pounds. · Cholesterol:   Target LDL <70. Lipitor 10. · Anti-platelet:   Remains on ASA. I will see him back in 6 months.  Thank you. Encounter Diagnoses   Name Primary?     Coronary artery disease involving native coronary artery of native heart without angina pectoris Yes     current treatment plan is effective, no change in therapy  lab results and schedule of future lab studies reviewed with patient  reviewed diet, exercise and weight control  cardiovascular risk and specific lipid/LDL goals reviewed  use of aspirin to prevent MI and TIA's discussed      HPI   Today, Mr. Rigoberto Miles has no complaints of chest pains. He has been under significant amount of stress with his wife undergoing open heart surgery in September 2021. He has lost about 10 pounds. He denies any changes in his activity levels. He denies any episodes of chest pains or increased shortness of breath. He denies any orthopnea or PND. He denies any palpitations or dizziness. Review of Systems   Respiratory: Negative for shortness of breath. Cardiovascular: Negative for chest pain, palpitations, orthopnea, claudication, leg swelling and PND. All other systems reviewed and are negative. Physical Exam  Vitals and nursing note reviewed. Constitutional:       Appearance: Normal appearance. HENT:      Head: Normocephalic. Eyes:      Conjunctiva/sclera: Conjunctivae normal.   Neck:      Vascular: No carotid bruit. Cardiovascular:      Rate and Rhythm: Normal rate and regular rhythm. Pulmonary:      Breath sounds: Normal breath sounds. Abdominal:      Palpations: Abdomen is soft. Musculoskeletal:         General: No swelling. Cervical back: No rigidity. Skin:     General: Skin is warm and dry. Neurological:      General: No focal deficit present. Mental Status: He is alert and oriented to person, place, and time.    Psychiatric:         Mood and Affect: Mood normal.         Behavior: Behavior normal.         PCP: Saray Kong MD    Past Medical History:   Diagnosis Date    CAD (coronary artery disease)     Diabetes ([address]     Essential hypertension     Hyperlipidemia        Past Surgical History:   Procedure Laterality Date    CARDIAC CATHETERIZATION  12/28/2016         CORONARY ARTERY ANGIOGRAM  12/28/2016         CORONARY STENT EA ADDL VESSEL  12/28/2016         CORONARY STENT SINGLE W/PTCA  12/28/2016         HX CORONARY STENT PLACEMENT      HX HEART CATHETERIZATION      LV ANGIOGRAPHY 12/28/2016            Current Outpatient Medications   Medication Sig Dispense Refill    atorvastatin (LIPITOR) 10 mg tablet Take 1 tablet by mouth once daily 90 Tablet 3    metFORMIN (GLUCOPHAGE) 500 mg tablet Take 1/2 (one-half) tablet by mouth once daily 45 Tablet 0    metoprolol succinate (TOPROL-XL) 25 mg XL tablet Take 1 tablet by mouth once daily  Indications: high blood pressure 90 Tablet 2    ramipriL (ALTACE) 10 mg capsule TAKE 1 CAPSULE BY MOUTH ONCE DAILY AS DIRECTED 90 Capsule 3    amLODIPine (NORVASC) 2.5 mg tablet Take 1 tablet by mouth once daily 90 Tablet 3    red yeast rice extract 600 mg cap Take 600 mg by mouth now.  nitroglycerin (NITROSTAT) 0.4 mg SL tablet 1 Tab by SubLINGual route every five (5) minutes as needed for Chest Pain. 25 Tab 3    aspirin 81 mg chewable tablet Take 81 mg by mouth daily. The patient has a family history of    Social History     Tobacco Use    Smoking status: Never Smoker    Smokeless tobacco: Never Used   Substance Use Topics    Alcohol use: No    Drug use: No       Lab Results   Component Value Date/Time    Cholesterol, total 147 11/02/2020 10:41 AM    HDL Cholesterol 35 11/02/2020 10:41 AM    LDL, calculated 77.2 11/02/2020 10:41 AM    Triglyceride 174 (H) 11/02/2020 10:41 AM    CHOL/HDL Ratio 4.2 11/02/2020 10:41 AM        BP Readings from Last 3 Encounters:   11/19/21 132/72   07/06/21 135/80   07/06/21 130/70        Pulse Readings from Last 3 Encounters:   11/19/21 75   06/21/21 86   05/25/21 79       Wt Readings from Last 3 Encounters:   11/19/21 73.5 kg (162 lb)   07/06/21 76.7 kg (169 lb)   07/06/21 76.7 kg (169 lb)         EKG: unchanged from previous tracings, normal sinus rhythm, LBBB.

## 2021-11-19 NOTE — PROGRESS NOTES
Leila Prather presents today for   Chief Complaint   Patient presents with    Follow-up       Leila Prather preferred language for health care discussion is english/other. Is someone accompanying this pt? no    Is the patient using any DME equipment during [address]? no    Depression Screening:  3 most recent PHQ Screens 11/19/2021   Little interest or pleasure in doing things Not at all   Feeling down, depressed, irritable, or hopeless Not at all   Total Score PHQ 2 0       Learning Assessment:  Learning Assessment 10/30/2020   PRIMARY LEARNER Patient   PRIMARY LANGUAGE ENGLISH   LEARNER PREFERENCE PRIMARY VIDEOS   ANSWERED BY patient   RELATIONSHIP SELF       Abuse Screening:  Abuse Screening Questionnaire 5/25/2021   Do you ever feel afraid of your partner? N   Are you in a relationship with someone who physically or mentally threatens you? N   Is it safe for you to go home? Y       Fall Risk  Fall Risk Assessment, last 12 mths 11/19/2021   Able to walk? Yes   Fall in past 12 months? 0   Do you feel unsteady? 0   Are you worried about falling 0   Number of falls in past 12 months -   Fall with injury? -       Pt currently taking Anticoagulant therapy? no    Coordination of Care:  1. Have you been to the ER, urgent care clinic since your last visit? Hospitalized since your last visit? no    2. Have you seen or consulted any other health care providers outside of the [address] [place] [address] since your last visit? Include any pap smears or colon screening.  no

## 2021-12-28 ENCOUNTER — APPOINTMENT (OUTPATIENT)
Dept: CT IMAGING | Age: 81
DRG: 179 | End: 2021-12-28
Attending: EMERGENCY MEDICINE
Payer: MEDICARE

## 2021-12-28 ENCOUNTER — APPOINTMENT (OUTPATIENT)
Dept: GENERAL RADIOLOGY | Age: 81
DRG: 179 | End: 2021-12-28
Attending: EMERGENCY MEDICINE
Payer: MEDICARE

## 2021-12-28 ENCOUNTER — HOSPITAL ENCOUNTER (INPATIENT)
Age: 81
LOS: 2 days | Discharge: HOME | DRG: 179 | End: 2021-12-30
Attending: EMERGENCY MEDICINE | Admitting: INTERNAL MEDICINE
Payer: MEDICARE

## 2021-12-28 DIAGNOSIS — G45.9 TIA (TRANSIENT ISCHEMIC ATTACK): ICD-10-CM

## 2021-12-28 DIAGNOSIS — R55 SYNCOPE AND COLLAPSE: Primary | ICD-10-CM

## 2021-12-28 DIAGNOSIS — J10.1 INFLUENZA A: ICD-10-CM

## 2021-12-28 DIAGNOSIS — J12.82 PNEUMONIA DUE TO COVID-19 VIRUS: ICD-10-CM

## 2021-12-28 DIAGNOSIS — J10.1 INFLUENZA B: ICD-10-CM

## 2021-12-28 DIAGNOSIS — U07.1 PNEUMONIA DUE TO COVID-19 VIRUS: ICD-10-CM

## 2021-12-28 PROBLEM — J11.1 INFLUENZA: Status: ACTIVE | Noted: 2021-12-28

## 2021-12-28 PROBLEM — R56.9 SEIZURE (HCC): Status: ACTIVE | Noted: 2021-12-28

## 2021-12-28 LAB
ALBUMIN SERPL-MCNC: 3.9 G/DL (ref 3.5–4.7)
ALBUMIN/GLOB SERPL: 1 {RATIO}
ALP SERPL-CCNC: 69 U/L (ref 38–126)
ALT SERPL-CCNC: 18 U/L (ref 3–72)
ANION GAP SERPL CALC-SCNC: 11 MMOL/L
APPEARANCE UR: CLEAR
AST SERPL W P-5'-P-CCNC: 31 U/L (ref 17–74)
ATRIAL RATE: 69 BPM
BASOPHILS # BLD: 0 K/UL (ref 0–0.1)
BASOPHILS NFR BLD: 0 % (ref 0–2)
BILIRUB SERPL-MCNC: 0.9 MG/DL (ref 0.2–1)
BILIRUB UR QL: NEGATIVE
BUN SERPL-MCNC: 24 MG/DL (ref 9–21)
BUN/CREAT SERPL: 18
CA-I BLD-MCNC: 9 MG/DL (ref 8.5–10.5)
CALCULATED P AXIS, ECG09: 71 DEGREES
CALCULATED R AXIS, ECG10: 5 DEGREES
CALCULATED T AXIS, ECG11: 83 DEGREES
CHLORIDE SERPL-SCNC: 102 MMOL/L (ref 94–111)
CO2 SERPL-SCNC: 22 MMOL/L (ref 21–33)
COLOR UR: YELLOW
COVID-19 RAPID TEST, COVR: DETECTED
CREAT SERPL-MCNC: 1.3 MG/DL (ref 0.8–1.5)
DIAGNOSIS, 93000: NORMAL
DIFFERENTIAL METHOD BLD: ABNORMAL
EOSINOPHIL # BLD: 0 K/UL (ref 0–0.4)
EOSINOPHIL NFR BLD: 0 % (ref 0–5)
ERYTHROCYTE [DISTWIDTH] IN BLOOD BY AUTOMATED COUNT: 14.5 % (ref 11.6–14.5)
FLUAV AG NPH QL IA: POSITIVE
FLUBV AG NOSE QL IA: POSITIVE
GLOBULIN SER CALC-MCNC: 3.8 G/DL
GLUCOSE BLD STRIP.AUTO-MCNC: 117 MG/DL (ref 70–110)
GLUCOSE SERPL-MCNC: 127 MG/DL (ref 70–110)
GLUCOSE UR STRIP.AUTO-MCNC: NEGATIVE MG/DL
HCT VFR BLD AUTO: 43.2 % (ref 36–48)
HGB BLD-MCNC: 14.4 G/DL (ref 13–16)
HGB UR QL STRIP: NEGATIVE
IMM GRANULOCYTES # BLD AUTO: 0 K/UL
IMM GRANULOCYTES NFR BLD AUTO: 0 %
INR PPP: 1 (ref 0.8–1.2)
KETONES UR QL STRIP.AUTO: NEGATIVE MG/DL
LACTATE SERPL-SCNC: 2.1 MMOL/L (ref 0.5–2)
LACTATE SERPL-SCNC: 2.2 MMOL/L (ref 0.5–2)
LEUKOCYTE ESTERASE UR QL STRIP.AUTO: NEGATIVE
LYMPHOCYTES # BLD: 1.6 K/UL (ref 0.9–3.6)
LYMPHOCYTES NFR BLD: 18 % (ref 21–52)
MAGNESIUM SERPL-MCNC: 2 MG/DL (ref 1.7–2.8)
MCH RBC QN AUTO: 34.4 PG (ref 24–34)
MCHC RBC AUTO-ENTMCNC: 33.3 G/DL (ref 31–37)
MCV RBC AUTO: 103.1 FL (ref 78–100)
MONOCYTES # BLD: 1.6 K/UL (ref 0.05–1.2)
MONOCYTES NFR BLD: 18 % (ref 3–10)
NEUTS SEG # BLD: 5.5 K/UL (ref 1.8–8)
NEUTS SEG NFR BLD: 64 % (ref 40–73)
NITRITE UR QL STRIP.AUTO: NEGATIVE
NRBC # BLD: 0 K/UL (ref 0–0.01)
NRBC BLD-RTO: 0 PER 100 WBC
P-R INTERVAL, ECG05: 203 MS
PERFORMED BY, TECHID: ABNORMAL
PH UR STRIP: 5.5 [PH] (ref 5–8)
PLATELET # BLD AUTO: 207 K/UL (ref 135–420)
PMV BLD AUTO: 10.4 FL (ref 9.2–11.8)
POTASSIUM SERPL-SCNC: 5 MMOL/L (ref 3.2–5.1)
PROT SERPL-MCNC: 7.7 G/DL (ref 6.1–8.4)
PROT UR STRIP-MCNC: NEGATIVE MG/DL
PROTHROMBIN TIME: 12.9 SEC (ref 11.5–15.2)
Q-T INTERVAL, ECG07: 485 MS
QRS DURATION, ECG06: 146 MS
QTC CALCULATION (BEZET), ECG08: 524 MS
RBC # BLD AUTO: 4.19 M/UL (ref 4.35–5.65)
RBC MORPH BLD: ABNORMAL
SODIUM SERPL-SCNC: 135 MMOL/L (ref 135–145)
SP GR UR REFRACTOMETRY: 1.03 (ref 1–1.03)
SPECIMEN SOURCE: ABNORMAL
TROPONIN I SERPL-MCNC: <0.02 NG/ML (ref 0.02–0.05)
TSH SERPL DL<=0.05 MIU/L-ACNC: 1.75 UIU/ML (ref 0.35–6.2)
UROBILINOGEN UR QL STRIP.AUTO: 0.2 EU/DL (ref 0.2–1)
VENTRICULAR RATE, ECG03: 70 BPM
WBC # BLD AUTO: 8.7 K/UL (ref 4.6–13.2)

## 2021-12-28 PROCEDURE — 80053 COMPREHEN METABOLIC PANEL: CPT

## 2021-12-28 PROCEDURE — 82962 GLUCOSE BLOOD TEST: CPT

## 2021-12-28 PROCEDURE — 84484 ASSAY OF TROPONIN QUANT: CPT

## 2021-12-28 PROCEDURE — 85610 PROTHROMBIN TIME: CPT

## 2021-12-28 PROCEDURE — 74011250637 HC RX REV CODE- 250/637: Performed by: NURSE PRACTITIONER

## 2021-12-28 PROCEDURE — 74011250636 HC RX REV CODE- 250/636: Performed by: NURSE PRACTITIONER

## 2021-12-28 PROCEDURE — 65270000034 HC RM STERILE ISOLATION

## 2021-12-28 PROCEDURE — 85025 COMPLETE CBC W/AUTO DIFF WBC: CPT

## 2021-12-28 PROCEDURE — 83605 ASSAY OF LACTIC ACID: CPT

## 2021-12-28 PROCEDURE — 87804 INFLUENZA ASSAY W/OPTIC: CPT

## 2021-12-28 PROCEDURE — 84443 ASSAY THYROID STIM HORMONE: CPT

## 2021-12-28 PROCEDURE — 87635 SARS-COV-2 COVID-19 AMP PRB: CPT

## 2021-12-28 PROCEDURE — 71045 X-RAY EXAM CHEST 1 VIEW: CPT

## 2021-12-28 PROCEDURE — 65270000029 HC RM PRIVATE

## 2021-12-28 PROCEDURE — 99285 EMERGENCY DEPT VISIT HI MDM: CPT

## 2021-12-28 PROCEDURE — 70450 CT HEAD/BRAIN W/O DYE: CPT

## 2021-12-28 PROCEDURE — 83735 ASSAY OF MAGNESIUM: CPT

## 2021-12-28 PROCEDURE — 81003 URINALYSIS AUTO W/O SCOPE: CPT

## 2021-12-28 PROCEDURE — 93005 ELECTROCARDIOGRAM TRACING: CPT

## 2021-12-28 RX ORDER — OSELTAMIVIR PHOSPHATE 75 MG/1
75 CAPSULE ORAL ONCE
Status: ACTIVE | OUTPATIENT
Start: 2021-12-28 | End: 2021-12-29

## 2021-12-28 RX ORDER — ATORVASTATIN CALCIUM 10 MG/1
10 TABLET, FILM COATED ORAL
Status: CANCELLED | OUTPATIENT
Start: 2021-12-28

## 2021-12-28 RX ORDER — DEXTROSE 50 % IN WATER (D50W) INTRAVENOUS SYRINGE
25-50 AS NEEDED
Status: DISCONTINUED | OUTPATIENT
Start: 2021-12-28 | End: 2021-12-30 | Stop reason: HOSPADM

## 2021-12-28 RX ORDER — ENOXAPARIN SODIUM 100 MG/ML
40 INJECTION SUBCUTANEOUS EVERY 24 HOURS
Status: DISCONTINUED | OUTPATIENT
Start: 2021-12-28 | End: 2021-12-30 | Stop reason: HOSPADM

## 2021-12-28 RX ORDER — METOPROLOL SUCCINATE 25 MG/1
25 TABLET, EXTENDED RELEASE ORAL DAILY
Status: DISCONTINUED | OUTPATIENT
Start: 2021-12-29 | End: 2021-12-30 | Stop reason: HOSPADM

## 2021-12-28 RX ORDER — CLOPIDOGREL BISULFATE 75 MG/1
75 TABLET ORAL DAILY
Status: DISCONTINUED | OUTPATIENT
Start: 2021-12-29 | End: 2021-12-30 | Stop reason: HOSPADM

## 2021-12-28 RX ORDER — AMLODIPINE BESYLATE 2.5 MG/1
2.5 TABLET ORAL DAILY
Status: DISCONTINUED | OUTPATIENT
Start: 2021-12-29 | End: 2021-12-30 | Stop reason: HOSPADM

## 2021-12-28 RX ORDER — MAGNESIUM SULFATE 100 %
4 CRYSTALS MISCELLANEOUS AS NEEDED
Status: DISCONTINUED | OUTPATIENT
Start: 2021-12-28 | End: 2021-12-30 | Stop reason: HOSPADM

## 2021-12-28 RX ORDER — ACETAMINOPHEN 325 MG/1
650 TABLET ORAL
Status: DISCONTINUED | OUTPATIENT
Start: 2021-12-28 | End: 2021-12-30 | Stop reason: HOSPADM

## 2021-12-28 RX ORDER — INSULIN LISPRO 100 [IU]/ML
INJECTION, SOLUTION INTRAVENOUS; SUBCUTANEOUS
Status: DISCONTINUED | OUTPATIENT
Start: 2021-12-28 | End: 2021-12-30 | Stop reason: HOSPADM

## 2021-12-28 RX ORDER — ONDANSETRON 2 MG/ML
4 INJECTION INTRAMUSCULAR; INTRAVENOUS
Status: DISCONTINUED | OUTPATIENT
Start: 2021-12-28 | End: 2021-12-30 | Stop reason: HOSPADM

## 2021-12-28 RX ORDER — OSELTAMIVIR PHOSPHATE 75 MG/1
75 CAPSULE ORAL 2 TIMES DAILY
Status: DISCONTINUED | OUTPATIENT
Start: 2021-12-29 | End: 2021-12-30 | Stop reason: HOSPADM

## 2021-12-28 RX ORDER — GUAIFENESIN 100 MG/5ML
81 LIQUID (ML) ORAL DAILY
Status: DISCONTINUED | OUTPATIENT
Start: 2021-12-29 | End: 2021-12-30 | Stop reason: HOSPADM

## 2021-12-28 RX ORDER — SODIUM CHLORIDE 9 MG/ML
75 INJECTION, SOLUTION INTRAVENOUS CONTINUOUS
Status: DISPENSED | OUTPATIENT
Start: 2021-12-28 | End: 2021-12-29

## 2021-12-28 RX ORDER — CLOPIDOGREL BISULFATE 75 MG/1
75 TABLET ORAL DAILY
Status: ON HOLD | COMMUNITY
End: 2021-12-29

## 2021-12-28 RX ORDER — ATORVASTATIN CALCIUM 40 MG/1
80 TABLET, FILM COATED ORAL
Status: DISCONTINUED | OUTPATIENT
Start: 2021-12-28 | End: 2021-12-30 | Stop reason: HOSPADM

## 2021-12-28 RX ORDER — HYDROCODONE BITARTRATE AND ACETAMINOPHEN 5; 325 MG/1; MG/1
1 TABLET ORAL
Status: DISCONTINUED | OUTPATIENT
Start: 2021-12-28 | End: 2021-12-28

## 2021-12-28 RX ADMIN — ATORVASTATIN CALCIUM 80 MG: 40 TABLET, FILM COATED ORAL at 21:20

## 2021-12-28 RX ADMIN — SODIUM CHLORIDE 75 ML/HR: 9 INJECTION, SOLUTION INTRAVENOUS at 20:38

## 2021-12-28 NOTE — ASSESSMENT & PLAN NOTE
-holding oral antidiabetic medications  -accuchecks priors to meals and bedtime  -implement sliding scale

## 2021-12-28 NOTE — ED NOTES
Per pt family, the pt had a seizure at home causing him to fall, reports it was witnessed but unsure of duration. MD notified.

## 2021-12-28 NOTE — ASSESSMENT & PLAN NOTE
-Chronic/controlled  -continue Norvasc and metoprolol (holding metoprolol for systolic less than 50)  -monitor BP and HR closely

## 2021-12-28 NOTE — ED PROVIDER NOTES
EMERGENCY DEPARTMENT HISTORY AND PHYSICAL EXAM      Date: 12/28/2021  Patient Name: Frida Vance      History of Presenting Illness     Chief Complaint   Patient presents with    Syncope       History Provided By: Patient and EMS    HPI: Frida Vance, [de-identified] y.o. male with a past medical history significant diabetes, hypertension, hyperlipidemia and CAD presents to the ED with cc of Light headedness, weakness followed by seizure activity and syncope. Patient states he was in the kitchen feeling suddenly very weak. He lay down and tried to get up again. Doesn't remember anything else until he woke up with EMS. Family stated he had generalized seizure activity for a few minutes. EMS state he had a heart rate of 32 upon arrival which improved spontaneously. Patient feels he is a little weak but much better. Occurred JPTA. There are no other complaints, changes, or physical findings at this time. PCP: Henny Pope MD    Current Facility-Administered Medications   Medication Dose Route Frequency Provider Last Rate Last Admin    enoxaparin (LOVENOX) injection 40 mg  40 mg SubCUTAneous Q24H Rosemarie Flores ACNP        acetaminophen (TYLENOL) tablet 650 mg  650 mg Oral Q6H PRN Rosemarie Flores ACNP        ondansetron (ZOFRAN) injection 4 mg  4 mg IntraVENous Q6H PRN Rosemarie Flores ACNP        0.9% sodium chloride infusion  75 mL/hr IntraVENous CONTINUOUS Jalyn Flores ACNP        sodium chloride 0.9 % bolus infusion 500 mL  500 mL IntraVENous ONCE Tim Haywood MD        oseltamivir (TAMIFLU) capsule 75 mg  75 mg Oral ONCE Sherry Obregon MD         Current Outpatient Medications   Medication Sig Dispense Refill    clopidogreL (PLAVIX) 75 mg tab Take 75 mg by mouth daily.       atorvastatin (LIPITOR) 10 mg tablet Take 1 tablet by mouth once daily 90 Tablet 3    metFORMIN (GLUCOPHAGE) 500 mg tablet Take 1/2 (one-half) tablet by mouth once daily 45 Tablet 0    metoprolol succinate (TOPROL-XL) 25 mg XL tablet Take 1 tablet by mouth once daily  Indications: high blood pressure 90 Tablet 2    ramipriL (ALTACE) 10 mg capsule TAKE 1 CAPSULE BY MOUTH ONCE DAILY AS DIRECTED 90 Capsule 3    amLODIPine (NORVASC) 2.5 mg tablet Take 1 tablet by mouth once daily 90 Tablet 3    red yeast rice extract 600 mg cap Take 600 mg by mouth now.  nitroglycerin (NITROSTAT) 0.4 mg SL tablet 1 Tab by SubLINGual route every five (5) minutes as needed for Chest Pain. 25 Tab 3    aspirin 81 mg chewable tablet Take 81 mg by mouth daily. Past History     Past Medical History:  Past Medical History:   Diagnosis Date    CAD (coronary artery disease)     Diabetes ([facility] [address]     Essential hypertension     Hyperlipidemia        Past Surgical History:  Past Surgical History:   Procedure Laterality Date    CARDIAC CATHETERIZATION  12/28/2016         CORONARY ARTERY ANGIOGRAM  12/28/2016         CORONARY STENT EA ADDL VESSEL  12/28/2016         CORONARY STENT SINGLE W/PTCA  12/28/2016         HX CORONARY STENT PLACEMENT      HX HEART CATHETERIZATION      LV ANGIOGRAPHY  12/28/2016            Family History:  Family History   Problem Relation Age of Onset    Diabetes Father     Heart Disease Father        Social History:  Social History     Tobacco Use    Smoking status: Never Smoker    Smokeless tobacco: Never Used   Vaping Use    Vaping Use: Never used   Substance Use Topics    Alcohol use: No    Drug use: No       Allergies:  No Known Allergies      Review of Systems     Review of Systems   Constitutional: Negative. HENT: Negative. Respiratory: Negative. Cardiovascular: Negative. Gastrointestinal: Negative. Genitourinary: Negative. Musculoskeletal: Negative. Neurological: Positive for seizures and syncope. All other systems reviewed and are negative. Physical Exam     Physical Exam  Vitals and nursing note reviewed. Constitutional:       Appearance: Normal appearance. HENT:      Head: Normocephalic. Eyes:      Extraocular Movements: Extraocular movements intact. Pupils: Pupils are equal, round, and reactive to light. Cardiovascular:      Rate and Rhythm: Normal rate and regular rhythm. Pulses: Normal pulses. Heart sounds: Normal heart sounds. Pulmonary:      Effort: Pulmonary effort is normal.      Breath sounds: Normal breath sounds. Abdominal:      Palpations: Abdomen is soft. Tenderness: There is no abdominal tenderness. Musculoskeletal:         General: Normal range of motion. Cervical back: Normal range of motion and neck supple. Skin:     General: Skin is warm and dry. Neurological:      General: No focal deficit present. Mental Status: He is alert and oriented to person, place, and time. Sensory: No sensory deficit. Motor: No weakness.          Lab and Diagnostic Study Results     Labs -     Recent Results (from the past 12 hour(s))   EKG, 12 LEAD, INITIAL    Collection Time: 12/28/21 10:01 AM   Result Value Ref Range    Ventricular Rate 70 BPM    Atrial Rate 69 BPM    P-R Interval 203 ms    QRS Duration 146 ms    Q-T Interval 485 ms    QTC Calculation (Bezet) 524 ms    Calculated P Axis 71 degrees    Calculated R Axis 5 degrees    Calculated T Axis 83 degrees    Diagnosis       Sinus Rhythm with 1st degree A-V block  Left bundle branch block  Abnormal ECG  When compared with ECG of 12/28/21  No significant change was found      Confirmed by George Washington ([id number]) on 12/28/2021 1:04:43 PM     CBC WITH AUTOMATED DIFF    Collection Time: 12/28/21 10:19 AM   Result Value Ref Range    WBC 8.7 4.6 - 13.2 K/uL    RBC 4.19 (L) 4.35 - 5.65 M/uL    HGB 14.4 13.0 - 16.0 g/dL    HCT 43.2 36.0 - 48.0 %    .1 (H) 78.0 - 100.0 FL    MCH 34.4 (H) 24.0 - 34.0 PG    MCHC 33.3 31.0 - 37.0 g/dL    RDW 14.5 11.6 - 14.5 %    PLATELET 546 053 - 512 K/uL    MPV 10.4 9.2 - 11.8 FL    NRBC 0.0 0.0  WBC    ABSOLUTE NRBC 0.00 0.00 - 0.01 K/uL    NEUTROPHILS 64 40 - 73 %    LYMPHOCYTES 18 (L) 21 - 52 %    MONOCYTES 18 (H) 3 - 10 %    EOSINOPHILS 0 0 - 5 %    BASOPHILS 0 0 - 2 %    IMMATURE GRANULOCYTES 0 %    ABS. NEUTROPHILS 5.5 1.8 - 8.0 K/UL    ABS. LYMPHOCYTES 1.6 0.9 - 3.6 K/UL    ABS. MONOCYTES 1.6 (H) 0.05 - 1.2 K/UL    ABS. EOSINOPHILS 0.0 0.0 - 0.4 K/UL    ABS. BASOPHILS 0.0 0.0 - 0.1 K/UL    ABS. IMM. GRANS. 0.0 K/UL    DF AUTOMATED      RBC COMMENTS Normocytic, Normochromic     PROTHROMBIN TIME + INR    Collection Time: 12/28/21 10:19 AM   Result Value Ref Range    Prothrombin time 12.9 11.5 - 15.2 sec    INR 1.0 0.8 - 1.2     METABOLIC PANEL, COMPREHENSIVE    Collection Time: 12/28/21 10:19 AM   Result Value Ref Range    Sodium 135 135 - 145 mmol/L    Potassium 5.0 3.2 - 5.1 mmol/L    Chloride 102 94 - 111 mmol/L    CO2 22 21 - 33 mmol/L    Anion gap 11 mmol/L    Glucose 127 (H) 70 - 110 mg/dL    BUN 24 (H) 9 - 21 mg/dL    Creatinine 1.30 0.8 - 1.50 mg/dL    BUN/Creatinine ratio 18      GFR est AA >60 ml/min/1.73m2    GFR est non-AA 53 ml/min/1.73m2    Calcium 9.0 8.5 - 10.5 mg/dL    Bilirubin, total 0.9 0.2 - 1.0 mg/dL    AST (SGOT) 31 17 - 74 U/L    ALT (SGPT) 18 3 - 72 U/L    Alk.  phosphatase 69 38 - 126 U/L    Protein, total 7.7 6.1 - 8.4 g/dL    Albumin 3.9 3.5 - 4.7 g/dL    Globulin 3.8 g/dL    A-G Ratio 1.0     TROPONIN I    Collection Time: 12/28/21 10:19 AM   Result Value Ref Range    Troponin-I, Qt. <0.02 (L) 0.02 - 0.05 ng/mL   MAGNESIUM    Collection Time: 12/28/21 10:19 AM   Result Value Ref Range    Magnesium 2.0 1.7 - 2.8 mg/dL   TSH 3RD GENERATION    Collection Time: 12/28/21 10:19 AM   Result Value Ref Range    TSH 1.75 0.35 - 6.20 uIU/mL   COVID-19 RAPID TEST    Collection Time: 12/28/21  4:30 PM   Result Value Ref Range    Specimen source Nasopharyngeal      COVID-19 rapid test DETECTED (A) Not Detected     INFLUENZA A & B AG (RAPID TEST)    Collection Time: 12/28/21  4:30 PM   Result Value Ref Range    Influenza A Antigen Positive (A) Negative      Influenza B Antigen Positive (A) Negative         Radiologic Studies -   [unfilled]  CT Results  (Last 48 hours)               12/28/21 1322  CT HEAD WO CONT Final result    Impression:      1. No intracranial hemorrhage, mass effect, or midline shift. 2. Senescent changes of the brain including diffuse volume loss and findings   most suggestive of chronic microvascular ischemia. 3. 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 Please note that CT may be negative in the setting of acute infarction and MRI   should be considered with continued clinical suspicion or lateralizing symptoms. Narrative:  EXAM: CT head       CLINICAL INDICATION/HISTORY: Syncope/dizziness. COMPARISON: None. TECHNIQUE: Axial CT imaging of the head was performed without intravenous   contrast.       One or more dose reduction techniques were used on this CT: automated exposure   control, adjustment of the mAs and/or kVp according to patient size, and   iterative reconstruction techniques. The specific techniques used on this CT   exam have been documented in the patient's electronic medical record. Digital   Imaging and Communications in Medicine (DICOM) format image data are available   to nonaffiliated external healthcare facilities or entities on a secure, media   free, reciprocally searchable basis with patient authorization for at least a   12-month period after this study. _______________       FINDINGS:       BRAIN AND POSTERIOR FOSSA: There is no intracranial hemorrhage, mass effect, or   midline shift. There is a mild degree of sulcal enlargement and ventricular   dilatation consistent with diffuse volume loss. There is hypoattenuation of the   periventricular white matter, which is nonspecific but most likely represents   changes from chronic microangiopathy. Chronic infarction of the anterior right   corona radiata.  Gray-white differentiation is maintained. EXTRA-AXIAL SPACES AND MENINGES: There are no abnormal extra-axial fluid   collections. CALVARIUM: Intact. SINUSES: Clear. OTHER: None.       _______________               CXR Results  (Last 48 hours)               12/28/21 1127  XR CHEST PORT Final result    Impression:      Mild bibasilar subsegmental atelectasis without focal consolidation in the   chest.       Narrative:  EXAM: One view chest x-ray       CLINICAL INDICATION/HISTORY: Syncopal episode. COMPARISON: 11/02/2020. TECHNIQUE: Single AP view of the chest was obtained.       _______________       FINDINGS:       HEART, VESSELS, MEDIASTINUM: Heart size is normal. No vascular congestion. There   is atherosclerosis of the thoracic aorta. LUNGS, PLEURAL SPACES: Hazy interstitial opacities at bilateral lung bases. No   effusion or pneumothorax. There are pleural calcifications. BONY THORAX, SOFT TISSUES: Unremarkable.       _______________                 Medical Decision Making and ED Course   - I am the first and primary provider for this patient AND AM THE PRIMARY PROVIDER OF RECORD. - I reviewed the vital signs, available nursing notes, past medical history, past surgical history, family history and social history. - Initial assessment performed. The patients presenting problems have been discussed, and the staff are in agreement with the care plan formulated and outlined with them. I have encouraged them to ask questions as they arise throughout their visit. Vital Signs-Reviewed the patient's vital signs.     Patient Vitals for the past 12 hrs:   Temp Pulse Resp BP SpO2   12/28/21 1336 -- 86 17 124/62 98 %   12/28/21 1335 -- 80 18 126/61 99 %   12/28/21 1334 -- 70 18 114/61 99 %   12/28/21 1301 -- 66 16 (!) 111/57 99 %   12/28/21 1231 -- 68 17 (!) 111/59 99 %   12/28/21 1201 98.7 °F (37.1 °C) 64 18 (!) 105/53 99 %   12/28/21 1132 -- 65 18 (!) 119/58 99 %   12/28/21 1116 -- 66 17 (!) 112/57 97 %   12/28/21 1032 -- 63 18 (!) 103/50 97 %   12/28/21 1000 -- 67 19 (!) 113/57 98 %       EKG interpretation: (Preliminary): Performed at 10:01a, and read at 10:01a  Rhythm: normal sinus rhythm; and regular . Rate (approx.): 70; Axis: normal; NJ interval: normal; QRS interval: prolonged; ST/T wave: non-specific changes; Other findings: LBBB. Records Reviewed: Nursing Notes    The patient presents with     ED Course:              Provider Notes (Medical Decision Making): [facility]           Consultations:       Consultations: - Tele-Neurology Consultant: Dr. Fior Noel: We have asked for emergent assistance with regard to this patient. We have discussed the acute and any chronic neurologic presentations of this patient with our Neurologist partner as well as the findings on the imaging and labs studies available thus far. They are recommending Admit syncope and seizure work up. MRI/MRA/Echo/EEG. Hold off anti seizure meds. Procedures and Critical Care       Performed by: Harley Haywood MD  PROCEDURES:  Procedures               CRITICAL CARE NOTE :  11:15 AM  Amount of Critical Care Time: 30(minutes)    IMPENDING DETERIORATION -CNS  ASSOCIATED RISK FACTORS - CNS Decompensation  MANAGEMENT- Bedside Assessment  INTERPRETATION -  CT Scan, Blood Pressure and Cardiac Output Measures   INTERVENTIONS - hemodynamic mngmt and Neurologic interventions   CASE REVIEW - Hospitalist/Intensivist and Medical Sub-Specialist  TREATMENT RESPONSE -Improved  PERFORMED BY - Self    NOTES   :  I have spent critical care time involved in lab review, consultations with specialist, family decision- making, bedside attention and documentation. This time excludes time spent in any separate billed procedures. During this entire length of time I was immediately available to the patient .     Harley Haywood MD        Disposition     Disposition: Admitted to Floor Medical Floor the case was discussed with the admitting physician Monika/Brayan MARTIN        Remove if not discharged  DISCHARGE PLAN:  1. Current Discharge Medication List      CONTINUE these medications which have NOT CHANGED    Details   clopidogreL (PLAVIX) 75 mg tab Take 75 mg by mouth daily. atorvastatin (LIPITOR) 10 mg tablet Take 1 tablet by mouth once daily  Qty: 90 Tablet, Refills: 3    Associated Diagnoses: Coronary artery disease involving native coronary artery of native heart without angina pectoris      metFORMIN (GLUCOPHAGE) 500 mg tablet Take 1/2 (one-half) tablet by mouth once daily  Qty: 45 Tablet, Refills: 0    Associated Diagnoses: Controlled type 2 diabetes mellitus with complication, without long-term current use of insulin (HCC)      metoprolol succinate (TOPROL-XL) 25 mg XL tablet Take 1 tablet by mouth once daily  Indications: high blood pressure  Qty: 90 Tablet, Refills: 2    Associated Diagnoses: Essential hypertension      ramipriL (ALTACE) 10 mg capsule TAKE 1 CAPSULE BY MOUTH ONCE DAILY AS DIRECTED  Qty: 90 Capsule, Refills: 3    Associated Diagnoses: Essential hypertension      amLODIPine (NORVASC) 2.5 mg tablet Take 1 tablet by mouth once daily  Qty: 90 Tablet, Refills: 3      red yeast rice extract 600 mg cap Take 600 mg by mouth now. nitroglycerin (NITROSTAT) 0.4 mg SL tablet 1 Tab by SubLINGual route every five (5) minutes as needed for Chest Pain. Qty: 25 Tab, Refills: 3      aspirin 81 mg chewable tablet Take 81 mg by mouth daily. 2.   Follow-up Information    None       3. Return to ED if worse   4. Current Discharge Medication List          Diagnosis     Clinical Impression:   1. Syncope and collapse    2. TIA (transient ischemic attack)    3. Pneumonia due to COVID-19 virus    4. Influenza A    5. Influenza B        Attestations:    Janessa Interiano MD    Please note that this dictation was completed with Shake, the [employer] voice recognition software.   Quite often unanticipated grammatical, syntax, homophones, and other interpretive errors are inadvertently transcribed by the computer software. Please disregard these errors. Please excuse any errors that have escaped final proofreading. Thank you.

## 2021-12-28 NOTE — ED TRIAGE NOTES
Per pt he started feeling \"funny\" and dizzy and sat down, reports he then passed out, per EMS while they were in the pt home he was awake and alert upon their arrival, reports that while there the pt has another syncopal episode with a heart rate that dropped to 32 and then came back up.

## 2021-12-29 ENCOUNTER — APPOINTMENT (OUTPATIENT)
Dept: NON INVASIVE DIAGNOSTICS | Age: 81
DRG: 179 | End: 2021-12-29
Attending: NURSE PRACTITIONER
Payer: MEDICARE

## 2021-12-29 LAB
ANION GAP SERPL CALC-SCNC: 8 MMOL/L
BUN SERPL-MCNC: 21 MG/DL (ref 9–21)
BUN/CREAT SERPL: 19
CA-I BLD-MCNC: 8.7 MG/DL (ref 8.5–10.5)
CHLORIDE SERPL-SCNC: 105 MMOL/L (ref 94–111)
CHOLEST SERPL-MCNC: 120 MG/DL
CO2 SERPL-SCNC: 26 MMOL/L (ref 21–33)
CREAT SERPL-MCNC: 1.1 MG/DL (ref 0.8–1.5)
ECHO AO ASC DIAM: 3.4 CM
ECHO AO ASCENDING AORTA INDEX: 1.82 CM/M2
ECHO AO ROOT DIAM: 3.6 CM
ECHO AO ROOT INDEX: 1.93 CM/M2
ECHO AV AREA PEAK VELOCITY: 2.4 CM2
ECHO AV AREA VTI: 2.5 CM2
ECHO AV AREA/BSA PEAK VELOCITY: 1.3 CM2/M2
ECHO AV AREA/BSA VTI: 1.3 CM2/M2
ECHO AV MEAN GRADIENT: 4 MMHG
ECHO AV MEAN VELOCITY: 1 M/S
ECHO AV PEAK GRADIENT: 7 MMHG
ECHO AV PEAK VELOCITY: 1.3 M/S
ECHO AV VELOCITY RATIO: 0.77
ECHO AV VTI: 24.2 CM
ECHO EST RA PRESSURE: 3 MMHG
ECHO LA AREA 2C: 16.5 CM2
ECHO LA AREA 4C: 13.9 CM2
ECHO LA DIAMETER INDEX: 1.93 CM/M2
ECHO LA DIAMETER: 3.6 CM
ECHO LA MAJOR AXIS: 4.7 CM
ECHO LA MINOR AXIS: 4.8 CM
ECHO LA TO AORTIC ROOT RATIO: 1
ECHO LA VOL BP: 39 ML (ref 18–58)
ECHO LA VOL/BSA BIPLANE: 21 ML/M2 (ref 16–34)
ECHO LV E' LATERAL VELOCITY: 7 CM/S
ECHO LV E' SEPTAL VELOCITY: 6 CM/S
ECHO LV EDV A2C: 30 ML
ECHO LV EDV A4C: 49 ML
ECHO LV EDV BP: 40 ML (ref 67–155)
ECHO LV EDV INDEX A4C: 26 ML/M2
ECHO LV EDV INDEX BP: 21 ML/M2
ECHO LV EDV NDEX A2C: 16 ML/M2
ECHO LV EJECTION FRACTION A2C: 65 %
ECHO LV EJECTION FRACTION A4C: 63 %
ECHO LV EJECTION FRACTION BIPLANE: 63 % (ref 55–100)
ECHO LV ESV A2C: 10 ML
ECHO LV ESV A4C: 18 ML
ECHO LV ESV INDEX A2C: 5 ML/M2
ECHO LV ESV INDEX A4C: 10 ML/M2
ECHO LV FRACTIONAL SHORTENING: 36 % (ref 28–44)
ECHO LV INTERNAL DIMENSION DIASTOLE INDEX: 2.25 CM/M2
ECHO LV INTERNAL DIMENSION DIASTOLIC: 4.2 CM (ref 4.2–5.9)
ECHO LV INTERNAL DIMENSION SYSTOLIC INDEX: 1.44 CM/M2
ECHO LV INTERNAL DIMENSION SYSTOLIC: 2.7 CM
ECHO LV IVSD: 1 CM (ref 0.6–1)
ECHO LV MASS 2D: 137.2 G (ref 88–224)
ECHO LV MASS INDEX 2D: 73.4 G/M2 (ref 49–115)
ECHO LV POSTERIOR WALL DIASTOLIC: 1 CM (ref 0.6–1)
ECHO LV RELATIVE WALL THICKNESS RATIO: 0.48
ECHO LVOT AREA: 3.1 CM2
ECHO LVOT AV VTI INDEX: 0.8
ECHO LVOT DIAM: 2 CM
ECHO LVOT MEAN GRADIENT: 2 MMHG
ECHO LVOT PEAK GRADIENT: 4 MMHG
ECHO LVOT PEAK VELOCITY: 1 M/S
ECHO LVOT STROKE VOLUME INDEX: 32.4 ML/M2
ECHO LVOT SV: 60.6 ML
ECHO LVOT VTI: 19.3 CM
ECHO MV A VELOCITY: 1.14 M/S
ECHO MV AREA VTI: 2.5 CM2
ECHO MV E DECELERATION TIME (DT): 173 MS
ECHO MV E VELOCITY: 0.82 M/S
ECHO MV E/A RATIO: 0.72
ECHO MV E/E' LATERAL: 11.71
ECHO MV E/E' RATIO (AVERAGED): 12.69
ECHO MV E/E' SEPTAL: 13.67
ECHO MV LVOT VTI INDEX: 1.26
ECHO MV MAX VELOCITY: 1.1 M/S
ECHO MV MEAN GRADIENT: 3 MMHG
ECHO MV MEAN VELOCITY: 0.8 M/S
ECHO MV PEAK GRADIENT: 5 MMHG
ECHO MV VTI: 24.3 CM
ECHO PV MAX VELOCITY: 1.2 M/S
ECHO PV PEAK GRADIENT: 5 MMHG
ECHO RA AREA 4C: 11.9 CM2
ECHO RIGHT VENTRICULAR SYSTOLIC PRESSURE (RVSP): 29 MMHG
ECHO RV BASAL DIMENSION: 3.3 CM
ECHO RV LONGITUDINAL DIMENSION: 6.7 CM
ECHO RV MID DIMENSION: 3 CM
ECHO RV TAPSE: 1.8 CM (ref 1.5–2)
ECHO TV REGURGITANT MAX VELOCITY: 2.54 M/S
ECHO TV REGURGITANT PEAK GRADIENT: 26 MMHG
ERYTHROCYTE [DISTWIDTH] IN BLOOD BY AUTOMATED COUNT: 14.8 % (ref 11.6–14.5)
EST. AVERAGE GLUCOSE BLD GHB EST-MCNC: 120 MG/DL
GLUCOSE BLD STRIP.AUTO-MCNC: 112 MG/DL (ref 70–110)
GLUCOSE BLD STRIP.AUTO-MCNC: 119 MG/DL (ref 70–110)
GLUCOSE BLD STRIP.AUTO-MCNC: 125 MG/DL (ref 70–110)
GLUCOSE BLD STRIP.AUTO-MCNC: 131 MG/DL (ref 70–110)
GLUCOSE BLD STRIP.AUTO-MCNC: 97 MG/DL (ref 70–110)
GLUCOSE SERPL-MCNC: 104 MG/DL (ref 70–110)
HBA1C MFR BLD: 5.8 % (ref 4.2–5.6)
HCT VFR BLD AUTO: 42.2 % (ref 36–48)
HDLC SERPL-MCNC: 28 MG/DL (ref 40–60)
HDLC SERPL: 4.3 {RATIO} (ref 0–5)
HGB BLD-MCNC: 14.1 G/DL (ref 13–16)
LDLC SERPL CALC-MCNC: 48.6 MG/DL (ref 0–100)
LIPID PROFILE,FLP: ABNORMAL
MCH RBC QN AUTO: 34.4 PG (ref 24–34)
MCHC RBC AUTO-ENTMCNC: 33.4 G/DL (ref 31–37)
MCV RBC AUTO: 102.9 FL (ref 78–100)
NRBC # BLD: 0 K/UL (ref 0–0.01)
NRBC BLD-RTO: 0 PER 100 WBC
PERFORMED BY, TECHID: ABNORMAL
PERFORMED BY, TECHID: NORMAL
PLATELET # BLD AUTO: 192 K/UL (ref 135–420)
PMV BLD AUTO: 9.9 FL (ref 9.2–11.8)
POTASSIUM SERPL-SCNC: 3.7 MMOL/L (ref 3.2–5.1)
RBC # BLD AUTO: 4.1 M/UL (ref 4.35–5.65)
SODIUM SERPL-SCNC: 139 MMOL/L (ref 135–145)
TRIGL SERPL-MCNC: 217 MG/DL (ref ?–150)
VLDLC SERPL CALC-MCNC: 43.4 MG/DL
WBC # BLD AUTO: 7.6 K/UL (ref 4.6–13.2)

## 2021-12-29 PROCEDURE — 80061 LIPID PANEL: CPT

## 2021-12-29 PROCEDURE — 74011250637 HC RX REV CODE- 250/637: Performed by: NURSE PRACTITIONER

## 2021-12-29 PROCEDURE — 83036 HEMOGLOBIN GLYCOSYLATED A1C: CPT

## 2021-12-29 PROCEDURE — 82962 GLUCOSE BLOOD TEST: CPT

## 2021-12-29 PROCEDURE — 80048 BASIC METABOLIC PNL TOTAL CA: CPT

## 2021-12-29 PROCEDURE — 36415 COLL VENOUS BLD VENIPUNCTURE: CPT

## 2021-12-29 PROCEDURE — 65270000029 HC RM PRIVATE

## 2021-12-29 PROCEDURE — 74011250636 HC RX REV CODE- 250/636: Performed by: NURSE PRACTITIONER

## 2021-12-29 PROCEDURE — 93306 TTE W/DOPPLER COMPLETE: CPT

## 2021-12-29 PROCEDURE — 85027 COMPLETE CBC AUTOMATED: CPT

## 2021-12-29 RX ORDER — DOBUTAMINE HYDROCHLORIDE 200 MG/100ML
INJECTION INTRAVENOUS
Status: CANCELLED | OUTPATIENT
Start: 2021-12-29

## 2021-12-29 RX ORDER — DOPAMINE HYDROCHLORIDE 160 MG/100ML
INJECTION, SOLUTION INTRAVENOUS
Status: CANCELLED | OUTPATIENT
Start: 2021-12-29

## 2021-12-29 RX ADMIN — OSELTAMIVIR PHOSPHATE 75 MG: 75 CAPSULE ORAL at 17:43

## 2021-12-29 RX ADMIN — CLOPIDOGREL BISULFATE 75 MG: 75 TABLET ORAL at 08:17

## 2021-12-29 RX ADMIN — ATORVASTATIN CALCIUM 80 MG: 40 TABLET, FILM COATED ORAL at 21:37

## 2021-12-29 RX ADMIN — ASPIRIN 81 MG: 81 TABLET, CHEWABLE ORAL at 08:17

## 2021-12-29 RX ADMIN — OSELTAMIVIR PHOSPHATE 75 MG: 75 CAPSULE ORAL at 08:17

## 2021-12-29 RX ADMIN — ENOXAPARIN SODIUM 40 MG: 100 INJECTION SUBCUTANEOUS at 17:43

## 2021-12-29 NOTE — ASSESSMENT & PLAN NOTE
-start IV hydration  -EKG/ continuous telemetry monitoring   -check orthostatic BP  -I&O  -CBC, BMP, UA  -ECHO ordered  -MRI/MRA head/neck  -CT head: No intracranial hemorrhage, mass effect, or midline shift.  Shown chronic infarcts  -discontinue offending agents  -PT/OT consults

## 2021-12-29 NOTE — PROGRESS NOTES
Problem: Falls - Risk of  Goal: *Absence of Falls  Description: Document Schuyler Wade Fall Risk and appropriate interventions in the flowsheet. Outcome: Progressing Towards Goal  Note: Fall Risk Interventions:            Medication Interventions: Patient to call before getting OOB         History of Falls Interventions: Bed/chair exit alarm         Problem: Patient Education: Go to Patient Education Activity  Goal: Patient/Family Education  Outcome: Progressing Towards Goal     Problem: Risk for Spread of Infection  Goal: Prevent transmission of infectious organism to others  Description: Prevent the transmission of infectious organisms to other patients, staff members, and visitors. Outcome: Progressing Towards Goal     Problem: Patient Education:  Go to Education Activity  Goal: Patient/Family Education  Outcome: Progressing Towards Goal     Problem: Airway Clearance - Ineffective  Goal: Achieve or maintain patent airway  Outcome: Progressing Towards Goal     Problem: Gas Exchange - Impaired  Goal: Absence of hypoxia  Outcome: Progressing Towards Goal  Goal: Promote optimal lung function  Outcome: Progressing Towards Goal     Problem: Breathing Pattern - Ineffective  Goal: Ability to achieve and maintain a regular respiratory rate  Outcome: Progressing Towards Goal     Problem:  Body Temperature -  Risk of, Imbalanced  Goal: Ability to maintain a body temperature within defined limits  Outcome: Progressing Towards Goal  Goal: Will regain or maintain usual level of consciousness  Outcome: Progressing Towards Goal  Goal: Complications related to the disease process, condition or treatment will be avoided or minimized  Outcome: Progressing Towards Goal     Problem: Isolation Precautions - Risk of Spread of Infection  Goal: Prevent transmission of infectious organism to others  Outcome: Progressing Towards Goal     Problem: Nutrition Deficits  Goal: Optimize nutrtional status  Outcome: Progressing Towards Goal Problem: Risk for Fluid Volume Deficit  Goal: Maintain normal heart rhythm  Outcome: Progressing Towards Goal  Goal: Maintain absence of muscle cramping  Outcome: Progressing Towards Goal  Goal: Maintain normal serum potassium, sodium, calcium, phosphorus, and pH  Outcome: Progressing Towards Goal     Problem: Loneliness or Risk for Loneliness  Goal: Demonstrate positive use of time alone when socialization is not possible  Outcome: Progressing Towards Goal     Problem: Fatigue  Goal: Verbalize increase energy and improved vitality  Outcome: Progressing Towards Goal     Problem: Patient Education: Go to Patient Education Activity  Goal: Patient/Family Education  Outcome: Progressing Towards Goal

## 2021-12-29 NOTE — INTERDISCIPLINARY ROUNDS
Per [facility], patient is fully vaccinated against COVID-19. Pt received 2 doses of Moderna - 05/21/2021 and 06/18/2021.  - Infection Control

## 2021-12-29 NOTE — PROGRESS NOTES
Orders received for P.T., pt states he is at his PLOF and does not require services at this time. Nursing notified.   Saad Nichols, PT, DPT

## 2021-12-29 NOTE — PROGRESS NOTES
OT eval orders received. Pt reports he is at PLOF and no OT eval indicated at present. Will assess if needs arise.  Thank you  Ha Owen MS, OTR/L

## 2021-12-29 NOTE — ASSESSMENT & PLAN NOTE
-patient positive for Influenza A and B  -patient is afebrile, only symptom is a cough  -started Tamiflu

## 2021-12-29 NOTE — PROGRESS NOTES
Reason for Admission: Chart reviewed and noted patient presented with C/O syncope and collapse. Pt states he wasn't feeling well, started feeling dizzy so he lowered his self down to the floor and doesn't remember much after that. He is also having a cough. Pt's family reports that he had seizure-like activity lasting only for a couple of seconds and that is when they called EMS. Rapid Covid- Positive, Influenza AMB- Positive, BUN- 27, Lactic Acid- 2.2. DX: Syncope and Collapse- TIA Workup    PMH: CAD, DM, HTN, HLD                     RUR Score: 9%                     Plan for utilizing home health: TBD          PCP: First and Last name:  Erica Marcus MD     Name of Practice:    Are you a current patient: Yes/No:    Approximate date of last visit:    Can you participate in a virtual visit with your PCP:                     Current Advanced Directive/Advance Care Plan: Full Code- No ACP      Healthcare Decision Maker: Gamaliel Casillas  Click here to complete [id number] including selection of the Healthcare Decision Maker Relationship (ie \"Primary\")                             Transition of Care Plan: TBD    Care Management Intervention  PCP Verified by CM: Yes  Transition of Care Consult (CM Consult): Discharge Planning  Current Support Network: Аннаerinn Brandt- [phone]. Patient lives at home with his wife and doesn't use any DME. He plans to return back home at discharge.

## 2021-12-29 NOTE — PROGRESS NOTES
Hospitalist Progress Note             Date of Service:  2021  NAME:  Valeria Camacho  :  1940  MRN:  [id number]    Assessment & Plan:      Syncope and collapse  -TIA workup  -tele neurologist consulted in the ED  -start IV hydration  -EKG/ continuous telemetry monitoring   -check orthostatic BP  -holding BP medications at this time  -I&O  -CBC, BMP, UA  -ECHO ordered  -MRI/MRA head/neck  -CT head: No intracranial hemorrhage, mass effect, or midline shift.  Shown chronic infarcts  -discontinue offending agents  -PT/OT consults   - HR dropped down to 32 per EMS, stop blocking agents and watch tele      COVID-19 virus  -confirmed on   -CXR: Mild bibasilar subsegmental atelectasis without focal consolidation in the chest  -stable on room air  -continue supportive care  -Tylenol PRN for fever  -encourage proning as tolerated  -Isolation precautions implemented     Seizure like activity  - no post ictal period, never had sz prior, ct w/o mass  - stongly suspect pt had syncope, with associated shaking, NOT sz  - furthermore, his sycope was preceeded by lightheadedness, and relieved the first time by sitting down  - no focal neuro findings, I feel that MRI is not neceasrry at time time, even more so b/c he is currently infected by covid and influenza     Dyslipidemia  -chronic  -continue Lipitor     Essential hypertension - currently controlled off meds  -Chronic/controlled  -monitor BP and HR closely     Controlled type 2 diabetes mellitus with complication, without long-term current use of insulin (HCC)  -holding oral antidiabetic medications  -accuchecks priors to meals and bedtime  -implement sliding scale     CAD (coronary artery disease)  -history of 4 stents in 2016  -continue ASA, Plavix, and statin     Influenza  -patient positive for Influenza A and B  -patient is afebrile, only symptom is a cough  -started [facility]        Hospital Problems  Date Reviewed: 11/19/2021          Codes Class Noted POA    * (Principal) Syncope and collapse ICD-10-CM: R55  ICD-9-CM: 780.2  12/28/2021 Yes        Seizure ([facility] ICD-10-CM: R56.9  ICD-9-CM: 780.39  12/28/2021 Unknown        Pneumonia due to COVID-19 virus ICD-10-CM: U07.1, J12.82  ICD-9-CM: 480.8, 079.89  12/28/2021 Unknown        Influenza ICD-10-CM: J11.1  ICD-9-CM: 487.1  12/28/2021 Unknown        Controlled type 2 diabetes mellitus with complication, without long-term current use of insulin ([facility] ICD-10-CM: E11.8  ICD-9-CM: 250.90  10/30/2020 Yes        CAD (coronary artery disease) ICD-10-CM: I25.10  ICD-9-CM: 414.00  4/18/2017 Unknown        Essential hypertension ICD-10-CM: I10  ICD-9-CM: 401.9  4/18/2017 Yes        Dyslipidemia ICD-10-CM: E78.5  ICD-9-CM: 272.4  4/18/2017 Yes                Review of Systems:   A comprehensive review of systems was negative except for that written in the HPI. Had loss of bowel control, no post ictal state, feels great today, no complaints  Despite flu and covid, no resp symptoms    Vital Signs:    Last 24hrs VS reviewed since prior progress note. Most recent are:  Visit Vitals  /68   Pulse 81   Temp 97.5 °F (36.4 °C)   Resp 19   Ht 5' 8\" (1.727 m)   Wt 73.5 kg (162 lb)   SpO2 97%   BMI 24.63 kg/m²         Intake/Output Summary (Last 24 hours) at 12/29/2021 1520  Last data filed at 12/29/2021 0641  Gross per 24 hour   Intake 1000 ml   Output 550 ml   Net 450 ml        Physical Examination:             General:          Alert, cooperative, no distress, appears stated age. HEENT:           Atraumatic, anicteric sclerae, pink conjunctivae                          No oral ulcers, mucosa moist, throat clear, dentition fair  Neck:               Supple, symmetrical  Lungs:             Clear to auscultation bilaterally. No Wheezing or Rhonchi. No rales. Chest wall:      No tenderness  No Accessory muscle use.   Heart: Regular  rhythm,  No  murmur   No edema  Abdomen:        Soft, non-tender. Not distended. Bowel sounds normal  Extremities:     No cyanosis. No clubbing,                            Skin turgor normal, Capillary refill normal  Skin:                Not pale. Not Jaundiced  No rashes   Psych:             Not anxious or agitated. Neurologic:      Alert, moves all extremities, answers questions appropriately and responds to commands        Data Review:    Review and/or order of clinical lab test  Review and/or order of tests in the radiology section of CPT  Review and/or order of tests in the medicine section of CPT      Labs:     Recent Labs     12/29/21  0330 12/28/21  1019   WBC 7.6 8.7   HGB 14.1 14.4   HCT 42.2 43.2    207     Recent Labs     12/29/21  0330 12/28/21  1019    135   K 3.7 5.0    102   CO2 26 22   BUN 21 24*   CREA 1.10 1.30    127*   CA 8.7 9.0   MG  --  2.0     Recent Labs     12/28/21  1019   ALT 18   AP 69   TBILI 0.9   TP 7.7   ALB 3.9   GLOB 3.8     Recent Labs     12/28/21  1019   INR 1.0   PTP 12.9      No results for input(s): FE, TIBC, PSAT, FERR in the last 72 hours. No results found for: FOL, RBCF   No results for input(s): PH, PCO2, PO2 in the last 72 hours.   Recent Labs     12/28/21  1019   TROIQ <0.02*     Lab Results   Component Value Date/Time    Cholesterol, total 120 12/29/2021 03:30 AM    HDL Cholesterol 28 (L) 12/29/2021 03:30 AM    LDL, calculated 48.6 12/29/2021 03:30 AM    Triglyceride 217 (H) 12/29/2021 03:30 AM    CHOL/HDL Ratio 4.3 12/29/2021 03:30 AM     Lab Results   Component Value Date/Time    Glucose (POC) 131 (H) 12/29/2021 11:50 AM    Glucose (POC) 119 (H) 12/29/2021 08:23 AM    Glucose (POC) 112 (H) 12/29/2021 06:31 AM    Glucose (POC) 117 (H) 12/28/2021 08:47 PM    Glucose (POC) 130 (H) 11/02/2020 10:59 AM     Lab Results   Component Value Date/Time    Color Yellow 12/28/2021 04:25 PM    Appearance Clear 12/28/2021 04:25 PM    Specific gravity 1.028 12/28/2021 04:25 PM    pH (UA) 5.5 12/28/2021 04:25 PM    Protein Negative 12/28/2021 04:25 PM    Glucose Negative 12/28/2021 04:25 PM    Ketone Negative 12/28/2021 04:25 PM    Bilirubin Negative 12/28/2021 04:25 PM    Urobilinogen 0.2 12/28/2021 04:25 PM    Nitrites Negative 12/28/2021 04:25 PM    Leukocyte Esterase Negative 12/28/2021 04:25 PM         Medications Reviewed:     Current Facility-Administered Medications   Medication Dose Route Frequency    enoxaparin (LOVENOX) injection 40 mg  40 mg SubCUTAneous Q24H    acetaminophen (TYLENOL) tablet 650 mg  650 mg Oral Q6H PRN    ondansetron (ZOFRAN) injection 4 mg  4 mg IntraVENous Q6H PRN    atorvastatin (LIPITOR) tablet 80 mg  80 mg Oral QHS    insulin lispro (HUMALOG) injection   SubCUTAneous AC&HS    glucose chewable tablet 16 g  4 Tablet Oral PRN    glucagon (GLUCAGEN) injection 1 mg  1 mg IntraMUSCular PRN    dextrose (D50W) injection syrg 12.5-25 g  25-50 mL IntraVENous PRN    oseltamivir (TAMIFLU) capsule 75 mg  75 mg Oral BID    [Held by provider] amLODIPine (NORVASC) tablet 2.5 mg  2.5 mg Oral DAILY    aspirin chewable tablet 81 mg  81 mg Oral DAILY    clopidogreL (PLAVIX) tablet 75 mg  75 mg Oral DAILY    [Held by provider] metoprolol succinate (TOPROL-XL) XL tablet 25 mg  25 mg Oral DAILY     ______________________________________________________________________  EXPECTED LENGTH OF STAY: 2d 7h  ACTUAL LENGTH OF STAY:          1                 Suad Marshall MD

## 2021-12-29 NOTE — PROGRESS NOTES
Comprehensive Nutrition Assessment    Type and Reason for Visit: Initial    Nutrition Recommendations/Plan: 4CHO choice Cardiac restricted diet with glucerna BID    Nutrition Assessment:  [de-identified] yo male PMH: CAD, DM, HTN, HLD   normal weight BMI 24. 6. admitted due to syncope and collapse with cough. Fully vaccinated against COVID-19 Moderna but rapid COVID test came back positive and pt also positive influenze A and B.  Currently on a Diabetic/Cardiac diet  Provide glucerna BID for reduced oral intake related to poor appetite    Recent Results (from the past 24 hour(s))   URINALYSIS W/ RFLX MICROSCOPIC    Collection Time: 12/28/21  4:25 PM   Result Value Ref Range    Color Yellow      Appearance Clear      Specific gravity 1.028 1.005 - 1.030      pH (UA) 5.5 5.0 - 8.0      Protein Negative Negative mg/dL    Glucose Negative Negative mg/dL    Ketone Negative Negative mg/dL    Bilirubin Negative Negative      Blood Negative Negative      Urobilinogen 0.2 0.2 - 1.0 EU/dL    Nitrites Negative Negative      Leukocyte Esterase Negative Negative     COVID-19 RAPID TEST    Collection Time: 12/28/21  4:30 PM   Result Value Ref Range    Specimen source Nasopharyngeal      COVID-19 rapid test DETECTED (A) Not Detected     INFLUENZA A & B AG (RAPID TEST)    Collection Time: 12/28/21  4:30 PM   Result Value Ref Range    Influenza A Antigen Positive (A) Negative      Influenza B Antigen Positive (A) Negative     GLUCOSE, POC    Collection Time: 12/28/21  8:47 PM   Result Value Ref Range    Glucose (POC) 117 (H) 70 - 110 mg/dL    Performed by Leora HERNANDEZ [employer]    LACTIC ACID    Collection Time: 12/28/21 10:50 PM   Result Value Ref Range    Lactic acid 2.1 (HH) 0.5 - 2.0 mmol/L   LIPID PANEL    Collection Time: 12/29/21  3:30 AM   Result Value Ref Range    LIPID PROFILE        Cholesterol, total 120 <200 mg/dL    Triglyceride 217 (H) <150 mg/dL    HDL Cholesterol 28 (L) 40 - 60 mg/dL    LDL, calculated 48.6 0 - 100 mg/dL VLDL, calculated 43.4 mg/dL    CHOL/HDL Ratio 4.3 0 - 5.0     HEMOGLOBIN A1C WITH EAG    Collection Time: 12/29/21  3:30 AM   Result Value Ref Range    Hemoglobin A1c 5.8 (H) 4.2 - 5.6 %    Est. average glucose 120 mg/dL   CBC W/O DIFF    Collection Time: 12/29/21  3:30 AM   Result Value Ref Range    WBC 7.6 4.6 - 13.2 K/uL    RBC 4.10 (L) 4.35 - 5.65 M/uL    HGB 14.1 13.0 - 16.0 g/dL    HCT 42.2 36.0 - 48.0 %    .9 (H) 78.0 - 100.0 FL    MCH 34.4 (H) 24.0 - 34.0 PG    MCHC 33.4 31.0 - 37.0 g/dL    RDW 14.8 (H) 11.6 - 14.5 %    PLATELET 936 704 - 506 K/uL    MPV 9.9 9.2 - 11.8 FL    NRBC 0.0 0.0  WBC    ABSOLUTE NRBC 0.00 0.00 - 8.32 K/uL   METABOLIC PANEL, BASIC    Collection Time: 12/29/21  3:30 AM   Result Value Ref Range    Sodium 139 135 - 145 mmol/L    Potassium 3.7 3.2 - 5.1 mmol/L    Chloride 105 94 - 111 mmol/L    CO2 26 21 - 33 mmol/L    Anion gap 8 mmol/L    Glucose 104 70 - 110 mg/dL    BUN 21 9 - 21 mg/dL    Creatinine 1.10 0.8 - 1.50 mg/dL    BUN/Creatinine ratio 19      GFR est AA >60 ml/min/1.73m2    GFR est non-AA >60 ml/min/1.73m2    Calcium 8.7 8.5 - 10.5 mg/dL   GLUCOSE, POC    Collection Time: 12/29/21  6:31 AM   Result Value Ref Range    Glucose (POC) 112 (H) 70 - 110 mg/dL    Performed by Leora Corey    GLUCOSE, POC    Collection Time: 12/29/21  8:23 AM   Result Value Ref Range    Glucose (POC) 119 (H) 70 - 110 mg/dL    Performed by Meenakshi Luong    ECHO ADULT COMPLETE    Collection Time: 12/29/21  8:39 AM   Result Value Ref Range    LV EDV A2C 30 mL    LV EDV A4C 49 mL    LV EDV BP 40 (A) 67 - 155 mL    LV ESV A2C 10 mL    LV ESV A4C 18 mL    IVSd 1.0 0.6 - 1.0 cm    LVIDd 4.2 4.2 - 5.9 cm    LVIDs 2.7 cm    LVOT Diameter 2.0 cm    LVOT Mean Gradient 2 mmHg    LVOT VTI 19.3 cm    LVOT Peak Velocity 1.0 m/s    LVOT Peak Gradient 4 mmHg    LVPWd 1.0 0.6 - 1.0 cm    LV E' Lateral Velocity 7 cm/s    LV E' Septal Velocity 6 cm/s    LV Ejection Fraction A2C 65 %    LV Ejection Fraction A4C 63 %    EF BP 63 55 - 100 %    LVOT Area 3.1 cm2    LVOT SV 60.6 ml    LA Minor Axis 4.8 cm    LA Major Malin 4.7 cm    LA Area 2C 16.5 cm2    LA Area 4C 13.9 cm2    LA Volume BP 39 18 - 58 mL    LA Diameter 3.6 cm    RA Area 4C 11.9 cm2    Est. RA Pressure 3 mmHg    AV Mean Gradient 4 mmHg    AV VTI 24.2 cm    AV Mean Velocity 1.0 m/s    AV Peak Velocity 1.3 m/s    AV Peak Gradient 7 mmHg    AV Area by VTI 2.5 cm2    AV Area by Peak Velocity 2.4 cm2    Aortic Root 3.6 cm    Ascending Aorta 3.4 cm    MV E Wave Deceleration Time 173.0 ms    MV A Velocity 1.14 m/s    MV E Velocity 0.82 m/s    MV Mean Gradient 3 mmHg    MV VTI 24.3 cm    MV Mean Velocity 0.8 m/s    MV Max Velocity 1.1 m/s    MV Peak Gradient 5 mmHg    MV Area by VTI 2.5 cm2    PV Max Velocity 1.2 m/s    PV Peak Gradient 5 mmHg    RV Basal Dimension 3.3 cm    RV Longitudinal Dimension 6.7 cm    RV Mid Dimension 3.0 cm    TAPSE 1.8 1.5 - 2.0 cm    TR Max Velocity 2.54 m/s    TR Peak Gradient 26 mmHg    Fractional Shortening 2D 36 28 - 44 %    LV EDV Index BP 21 mL/m2    LV ESV Index A4C 10 mL/m2    LV EDV Index A4C 26 mL/m2    LV ESV Index A2C 5 mL/m2    LV EDV Index A2C 16 mL/m2    LVIDd Index 2.25 cm/m2    LVIDs Index 1.44 cm/m2    LV RWT Ratio 0.48     LV Mass 2D 137.2 88 - 224 g    LV Mass 2D Index 73.4 49 - 115 g/m2    MV E/A 0.72     E/E' Ratio (Averaged) 12.69     E/E' Lateral 11.71     E/E' Septal 13.67     LA Volume Index BP 21 16 - 34 ml/m2    LVOT Stroke Volume Index 32.4 mL/m2    LA Size Index 1.93 cm/m2    LA/AO Root Ratio 1.00     Ao Root Index 1.93 cm/m2    Ascending Aorta Index 1.82 cm/m2    AV Velocity Ratio 0.77     LVOT:AV VTI Index 0.80     ROBERT/BSA VTI 1.3 cm2/m2    ROBERT/BSA Peak Velocity 1.3 cm2/m2    MV:LVOT VTI Index 1.26     RVSP 29 mmHg   GLUCOSE, POC    Collection Time: 12/29/21 11:50 AM   Result Value Ref Range    Glucose (POC) 131 (H) 70 - 110 mg/dL    Performed by Cate Zuniga Assessment:  Malnutrition Status:  Insufficient data (COVID isolation)    Context:  Acute illness     Findings of the 6 clinical characteristics of malnutrition:   Energy Intake:  Mild decrease in energy intake (specify) (COVID and influenza + poor appetite)  Weight Loss:  Unable to assess     Body Fat Loss:  Unable to assess,     Muscle Mass Loss:  Unable to assess,    Fluid Accumulation:  Unable to assess,     Strength:  Not performed         Estimated Daily Nutrient Needs:  Energy (kcal): 7159-7685 kcal/day; Weight Used for Energy Requirements: Admission (74 kg)  Protein (g): 60-74 g/day; Weight Used for Protein Requirements: Admission (0.8-1g/kg)  Fluid (ml/day): 4437-2597 mL/day; Method Used for Fluid Requirements: 1 ml/kcal      Nutrition Related Findings:  normal weight BMI 24. 6. admitted due to syncope and collapse with cough. Fully vaccinated against COVID-19 Moderna but rapid COVID test came back positive and pt also positive influenze A and B. Currently on a Diabetic/Cardiac diet      Wounds:    None       Current Nutrition Therapies:  ADULT DIET Regular; 4 carb choices (60 gm/meal); Low Fat/Low Chol/High Fiber/2 gm Na; Low Sodium (2 gm)    Anthropometric Measures:  · Height:  5' 8\" (172.7 cm)  · Current Body Wt:  73.5 kg (162 lb)   · Admission Body Wt:  162 lb    · Usual Body Wt:        · Ideal Body Wt:  154 lbs:  105.2 %   · Adjusted Body Weight:   ; Weight Adjustment for: No adjustment   · Adjusted BMI:       · BMI Category:  Normal weight (BMI 18.5-24. 9)       Nutrition Diagnosis:   · Inadequate oral intake related to other (specify),early satiety as evidenced by intake 0-25%      Nutrition Interventions:   Food and/or Nutrient Delivery: Continue current diet,Start oral nutrition supplement  Nutrition Education and Counseling: Education not appropriate  Coordination of Nutrition Care: Continue to monitor while inpatient    Goals:  BG , Hgb A1c less than 7, Pt to eat greater than 75% of meals, BM every 1-3 days, BMI 18.5-24.9       Nutrition Monitoring and Evaluation:   Behavioral-Environmental Outcomes:    Food/Nutrient Intake Outcomes: Food and nutrient intake,Supplement intake  Physical Signs/Symptoms Outcomes: Biochemical data,Meal time behavior,Weight     F/U: 1/2/2021    Discharge Planning:    Continue current diet     Electronically signed by Abbey Joshi on 12/29/2021 at 2:47 PM    Contact: SARABJIT [phone]

## 2021-12-29 NOTE — PROGRESS NOTES
0700- assumed care of patient    0900- AM meds given without difficulties. Full assessment complete. Patient is on room air. He states he feels well and is worried about his family at home (all covid +) no distress noted. CBWR    1200- FSBS WNL, lunch tray given to pt. Urinal emptied. CBWR    1400- pt taking a nap, no distress noted. VSS. CBWR    1600- FSBS WNL no insulin needed. Pt continues to deny pain/needs at this time.  CBWR

## 2021-12-29 NOTE — ASSESSMENT & PLAN NOTE
-witnessed seizure like activity by the family, none in the ED  -will hold off on starting antiepileptic medications  -patient will need an EEG on an outpatient basis  -will get MRI/MRA brain while inpatient

## 2021-12-29 NOTE — PROGRESS NOTES
Received report assumed care. 2000 Patient awake alert denies pain, IVF infusing admission completed. Patient on room air no SOB. Droplet plus  isolation    0001 Patient continues to sleep quietly no complaints    0500 No problems overnight  0700     0700 Report to oncoming shift

## 2021-12-29 NOTE — H&P
History and Physical    Subjective: Attila Figueroa is a [de-identified] y.o. elderly male with a past medical history for coronary artery disease with history of stents in 2016, diabetes, hypertension, hyperlipidemia, patient presented to the ED with a chief complaint of syncope and collapse. Patient reports that he was not feeling well started feeling dizziness, so he lowered his self down to the floor and does not remember much after that. Only other accompanying symptoms patient complained about is a cough. Patient family reports that he had seizure-like activity lasting only for couple of seconds and that is when they call EMS. Patient denies shortness of breath, chest pain, palpitations, nausea, vomiting, and diarrhea. Patient is vaccinated, but reports that his son but stays with them is positive for COVID-19. In the ED patient rapid Covid was positive, patient influenza AMB was positive, BUN 27, lactic acid 2.2, CT of the head no intracranial hemorrhage mass or midline shift, there is suggestion for chronic microvascular ischemia and chronic heart, chest x-ray showed mild bibasilar subsegmental atelectasis without focal consolidation and EKG shows sinus rhythm with a first-degree AV block and a left bundle branch block that was shown on previous EKG. While in the ED patient was ordered to receive 75 mg of Tamiflu. Admit to telemetry. Patient assessed in the ED, at the bedside, patient is alert and oriented, there is no acute distress noted. Patient agrees to admission for a diagnosis of syncope and collapse and TIA, treatment to include neurological assessment, MRI/MRA of the brain and neck, cardiac echo in the a.m.     Past Medical History:   Diagnosis Date    CAD (coronary artery disease)     Diabetes ([facility] [address]     Essential hypertension     Hyperlipidemia       Past Surgical History:   Procedure Laterality Date    CARDIAC CATHETERIZATION  12/28/2016         CORONARY ARTERY ANGIOGRAM  12/28/2016  CORONARY STENT EA ADDL VESSEL  12/28/2016         CORONARY STENT SINGLE W/PTCA  12/28/2016         HX CORONARY STENT PLACEMENT      HX HEART CATHETERIZATION      LV ANGIOGRAPHY  12/28/2016          Family History   Problem Relation Age of Onset    Diabetes Father     Heart Disease Father       Social History     Tobacco Use    Smoking status: Never Smoker    Smokeless tobacco: Never Used   Substance Use Topics    Alcohol use: No       Prior to Admission medications    Medication Sig Start Date End Date Taking? Authorizing Provider   clopidogreL (PLAVIX) 75 mg tab Take 75 mg by mouth daily. Other, MD Ezra   atorvastatin (LIPITOR) 10 mg tablet Take 1 tablet by mouth once daily 6/21/21   Jeffry Hill MD   metFORMIN (GLUCOPHAGE) 500 mg tablet Take 1/2 (one-half) tablet by mouth once daily 6/21/21   Jeffry Hill MD   metoprolol succinate (TOPROL-XL) 25 mg XL tablet Take 1 tablet by mouth once daily  Indications: high blood pressure 6/21/21   Jeffry Hill MD   ramipriL (ALTACE) 10 mg capsule TAKE 1 CAPSULE BY MOUTH ONCE DAILY AS DIRECTED 6/21/21   Jeffry Hill MD   amLODIPine (NORVASC) 2.5 mg tablet Take 1 tablet by mouth once daily 5/25/21   Og Horne MD   red yeast rice extract 600 mg cap Take 600 mg by mouth now. Provider, Historical   nitroglycerin (NITROSTAT) 0.4 mg SL tablet 1 Tab by SubLINGual route every five (5) minutes as needed for Chest Pain. 1/30/17   Kevin Seasy, NP   aspirin 81 mg chewable tablet Take 81 mg by mouth daily.     Provider, Historical     No Known Allergies       REVIEW OF SYSTEMS:       Total of 12 systems reviewed as follows:    Positive = Red  Constitutional: Negative for malaise/fatigue and weakness, negative for fever and chills   HENT: Negative for ear pain, headaches, negative for loss of sense of taste and smell   Eyes: Negative for blurred vision and double vision   Skin: Negative for itching, negative for open areas Cardiovascular: Negative for chest pain, palpitations, negative for swelling   Respiratory: Negative for shortness or breath, negative for cough, negative for sputum production   Gastrointestinal: Negative for abdominal pain, constipation, nausea, vomiting, and diarrhea   Genitourinary: Negative for dysuria, frequency, and hematuria   Musculoskeletal: Negative for joint pain and myalgias   Neurological: Negative for dizziness and sweating. Seizure activity reported by family. Psychiatric: Negative for depression and anxiousness       Objective:   VITALS:    Visit Vitals  BP (!) 119/53 (BP 1 Location: Left upper arm, BP Patient Position: At rest)   Pulse 76   Temp 98.7 °F (37.1 °C)   Resp 18   Ht 5' 8\" (1.727 m)   Wt 73.5 kg (162 lb)   SpO2 98%   BMI 24.63 kg/m²       PHYSICAL EXAM:  Positive = Red  Constitutional: Alert and oriented x 3 and no noted acute distress appears to be stated age. HENT: Atraumatic, nose midline, oropharynx clear ad moist, trachea midline, no supraclavicular   Eyes: Conjunctiva normal and pupils equal   Skin: Dry, intact, warm, and dry   Cardiovascular: Regular rate and rhythm, normal heart sounds, no murmurs, pulses palpable, no noted edema   Respiratory: Lungs clear throughout, no wheezes, rales, or rhonchi, effort normal   Gastrointestinal: Appearance normal, bowel sounds are normal, bowl soft and non-tender   Genitourinary: Deferred   Musculoskeletal: Normal ROM   Neurological: Alert and oriented x 3, awake. No facial droop. No slurred speech. Hand grasps equal. Strength 5/5 in all extremities.  Intact sensations   Psychiatric: Affect normal, Answers questions appropriately     __________________________________________________  Care Plan discussed with:    Comments   Patient X    Family      RN     Care Manager                    Consultant:      _______________________________________________________________________  Expected  Disposition:   Home with Family X   HH/PT/OT/RN SNF/LTC    JANET    ________________________________________________________________________    Labs:  Recent Results (from the past 24 hour(s))   EKG, 12 LEAD, INITIAL    Collection Time: 12/28/21 10:01 AM   Result Value Ref Range    Ventricular Rate 70 BPM    Atrial Rate 69 BPM    P-R Interval 203 ms    QRS Duration 146 ms    Q-T Interval 485 ms    QTC Calculation (Bezet) 524 ms    Calculated P Axis 71 degrees    Calculated R Axis 5 degrees    Calculated T Axis 83 degrees    Diagnosis       Sinus Rhythm with 1st degree A-V block  Left bundle branch block  Abnormal ECG  When compared with ECG of 12/28/21  No significant change was found      Confirmed by Norberto Gauthier ([id number]) on 12/28/2021 1:04:43 PM     CBC WITH AUTOMATED DIFF    Collection Time: 12/28/21 10:19 AM   Result Value Ref Range    WBC 8.7 4.6 - 13.2 K/uL    RBC 4.19 (L) 4.35 - 5.65 M/uL    HGB 14.4 13.0 - 16.0 g/dL    HCT 43.2 36.0 - 48.0 %    .1 (H) 78.0 - 100.0 FL    MCH 34.4 (H) 24.0 - 34.0 PG    MCHC 33.3 31.0 - 37.0 g/dL    RDW 14.5 11.6 - 14.5 %    PLATELET 865 165 - 805 K/uL    MPV 10.4 9.2 - 11.8 FL    NRBC 0.0 0.0  WBC    ABSOLUTE NRBC 0.00 0.00 - 0.01 K/uL    NEUTROPHILS 64 40 - 73 %    LYMPHOCYTES 18 (L) 21 - 52 %    MONOCYTES 18 (H) 3 - 10 %    EOSINOPHILS 0 0 - 5 %    BASOPHILS 0 0 - 2 %    IMMATURE GRANULOCYTES 0 %    ABS. NEUTROPHILS 5.5 1.8 - 8.0 K/UL    ABS. LYMPHOCYTES 1.6 0.9 - 3.6 K/UL    ABS. MONOCYTES 1.6 (H) 0.05 - 1.2 K/UL    ABS. EOSINOPHILS 0.0 0.0 - 0.4 K/UL    ABS. BASOPHILS 0.0 0.0 - 0.1 K/UL    ABS. IMM.  GRANS. 0.0 K/UL    DF AUTOMATED      RBC COMMENTS Normocytic, Normochromic     PROTHROMBIN TIME + INR    Collection Time: 12/28/21 10:19 AM   Result Value Ref Range    Prothrombin time 12.9 11.5 - 15.2 sec    INR 1.0 0.8 - 1.2     METABOLIC PANEL, COMPREHENSIVE    Collection Time: 12/28/21 10:19 AM   Result Value Ref Range    Sodium 135 135 - 145 mmol/L    Potassium 5.0 3.2 - 5.1 mmol/L    Chloride 102 94 - 111 mmol/L    CO2 22 21 - 33 mmol/L    Anion gap 11 mmol/L    Glucose 127 (H) 70 - 110 mg/dL    BUN 24 (H) 9 - 21 mg/dL    Creatinine 1.30 0.8 - 1.50 mg/dL    BUN/Creatinine ratio 18      GFR est AA >60 ml/min/1.73m2    GFR est non-AA 53 ml/min/1.73m2    Calcium 9.0 8.5 - 10.5 mg/dL    Bilirubin, total 0.9 0.2 - 1.0 mg/dL    AST (SGOT) 31 17 - 74 U/L    ALT (SGPT) 18 3 - 72 U/L    Alk.  phosphatase 69 38 - 126 U/L    Protein, total 7.7 6.1 - 8.4 g/dL    Albumin 3.9 3.5 - 4.7 g/dL    Globulin 3.8 g/dL    A-G Ratio 1.0     TROPONIN I    Collection Time: 12/28/21 10:19 AM   Result Value Ref Range    Troponin-I, Qt. <0.02 (L) 0.02 - 0.05 ng/mL   MAGNESIUM    Collection Time: 12/28/21 10:19 AM   Result Value Ref Range    Magnesium 2.0 1.7 - 2.8 mg/dL   TSH 3RD GENERATION    Collection Time: 12/28/21 10:19 AM   Result Value Ref Range    TSH 1.75 0.35 - 6.20 uIU/mL   LACTIC ACID    Collection Time: 12/28/21 10:19 AM   Result Value Ref Range    Lactic acid 2.2 (HH) 0.5 - 2.0 mmol/L   URINALYSIS W/ RFLX MICROSCOPIC    Collection Time: 12/28/21  4:25 PM   Result Value Ref Range    Color Yellow      Appearance Clear      Specific gravity 1.028 1.005 - 1.030      pH (UA) 5.5 5.0 - 8.0      Protein Negative Negative mg/dL    Glucose Negative Negative mg/dL    Ketone Negative Negative mg/dL    Bilirubin Negative Negative      Blood Negative Negative      Urobilinogen 0.2 0.2 - 1.0 EU/dL    Nitrites Negative Negative      Leukocyte Esterase Negative Negative     COVID-19 RAPID TEST    Collection Time: 12/28/21  4:30 PM   Result Value Ref Range    Specimen source Nasopharyngeal      COVID-19 rapid test DETECTED (A) Not Detected     INFLUENZA A & B AG (RAPID TEST)    Collection Time: 12/28/21  4:30 PM   Result Value Ref Range    Influenza A Antigen Positive (A) Negative      Influenza B Antigen Positive (A) Negative         Imaging:  CT HEAD WO CONT    Result Date: 12/28/2021  EXAM: CT head CLINICAL INDICATION/HISTORY: Syncope/dizziness. COMPARISON: None. TECHNIQUE: Axial CT imaging of the head was performed without intravenous contrast. One or more dose reduction techniques were used on this CT: automated exposure control, adjustment of the mAs and/or kVp according to patient size, and iterative reconstruction techniques. The specific techniques used on this CT exam have been documented in the patient's electronic medical record. Digital Imaging and Communications in Medicine (DICOM) format image data are available to nonaffiliated external healthcare facilities or entities on a secure, media free, reciprocally searchable basis with patient authorization for at least a 12-month period after this study. _______________ FINDINGS: BRAIN AND POSTERIOR FOSSA: There is no intracranial hemorrhage, mass effect, or midline shift. There is a mild degree of sulcal enlargement and ventricular dilatation consistent with diffuse volume loss. There is hypoattenuation of the periventricular white matter, which is nonspecific but most likely represents changes from chronic microangiopathy. 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 Gray-white differentiation is maintained. EXTRA-AXIAL SPACES AND MENINGES: There are no abnormal extra-axial fluid collections. CALVARIUM: Intact. SINUSES: Clear. OTHER: None. _______________     1. No intracranial hemorrhage, mass effect, or midline shift. 2. Senescent changes of the brain including diffuse volume loss and findings most suggestive of chronic microvascular ischemia. 3. 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 Please note that CT may be negative in the setting of acute infarction and MRI should be considered with continued clinical suspicion or lateralizing symptoms. XR CHEST PORT    Result Date: 12/28/2021  EXAM: One view chest x-ray CLINICAL INDICATION/HISTORY: Syncopal episode. COMPARISON: 11/02/2020. TECHNIQUE: Single AP view of the chest was obtained. _______________ FINDINGS: HEART, VESSELS, MEDIASTINUM: Heart size is normal. No vascular congestion. There is atherosclerosis of the thoracic aorta. LUNGS, PLEURAL SPACES: Hazy interstitial opacities at bilateral lung bases. No effusion or pneumothorax. There are pleural calcifications. BONY THORAX, SOFT TISSUES: Unremarkable. _______________     Mild bibasilar subsegmental atelectasis without focal consolidation in the chest.       Assessment & Plan:     Syncope and collapse  -TIA workup  -tele neurologist consulted in the ED  -start IV hydration  -EKG/ continuous telemetry monitoring   -check orthostatic BP  -holding BP medications at this time  -I&O  -CBC, BMP, UA  -ECHO ordered  -MRI/MRA head/neck  -CT head: No intracranial hemorrhage, mass effect, or midline shift.  Shown chronic infarcts  -discontinue offending agents  -PT/OT consults      ADGSG-32 virus  -confirmed on 12/28  -CXR: Mild bibasilar subsegmental atelectasis without focal consolidation in the chest  -stable on room air  -continue supportive care  -Tylenol PRN for fever  -encourage proning as tolerated  -Isolation precautions implemented    Seizure ([address]  -witnessed seizure like activity by the family, none in the ED  -will hold off on starting antiepileptic medications  -patient will need an EEG on an outpatient basis  -will get MRI/MRA brain while inpatient  -lactic acid 2.2, will trend    Dyslipidemia  -chronic  -continue Lipitor  -lipid panel in the am    Essential hypertension  -Chronic/controlled  -will hold off on starting BP medications at this time  -awaiting orthostatic BP  -monitor BP and HR closely    Controlled type 2 diabetes mellitus with complication, without long-term current use of insulin (HCC)  -holding oral antidiabetic medications  -accuchecks priors to meals and bedtime  -implement sliding scale    CAD (coronary artery disease)  -history of 4 stents in 2016  -continue ASA, Plavix, and statin    Influenza  -patient positive for Influenza A and B  -patient is afebrile, only symptom is a cough  -started Tamiflu      TOTAL TIME:  45 Minutes    Code Status: Full    Prophylaxis:  Lovenox    Electronically Signed : Gracie Benito [facility]    Please note that this dictation was completed with [employer], the computer voice recognition software. Quite often unanticipated grammatical, syntax, homophones, and other interpretive errors are inadvertently transcribed by the computer software. Please disregard these errors. Please excuse any errors that have escaped final proofreading. Thank you.

## 2021-12-29 NOTE — PROGRESS NOTES
Admission Medication Reconciliation:    Information obtained from:  patient refill history and past doctor visit notes in EPIC    Comments/Recommendations: Reviewed PTA medications and patient's allergies. Updates:  Nitroglycerin noted to last have been filled on 2017 and only uses prn   2. Clopidogrel according to cardiology note on 21 was stopped per patient back in 2021  3. Metformin 500mg left on list, however was last filled on 2021 for a 90 day supply         Allergies:  Patient has no known allergies. Significant PMH/Disease States:   Past Medical History:   Diagnosis Date    CAD (coronary artery disease)     Diabetes ([facility] [address]     Essential hypertension     Hyperlipidemia      Chief Complaint for this Admission:    Chief Complaint   Patient presents with    Syncope     Prior to Admission Medications:   Prior to Admission Medications   Prescriptions Last Dose Informant Patient Reported? Taking? amLODIPine (NORVASC) 2.5 mg tablet   No No   Sig: Take 1 tablet by mouth once daily   aspirin 81 mg chewable tablet   Yes No   Sig: Take 81 mg by mouth daily. atorvastatin (LIPITOR) 10 mg tablet   No No   Sig: Take 1 tablet by mouth once daily   metFORMIN (GLUCOPHAGE) 500 mg tablet   No No   Sig: Take 1/2 (one-half) tablet by mouth once daily   metoprolol succinate (TOPROL-XL) 25 mg XL tablet   No No   Sig: Take 1 tablet by mouth once daily  Indications: high blood pressure   nitroglycerin (NITROSTAT) 0.4 mg SL tablet Unknown at Unknown time  No No   Si Tab by SubLINGual route every five (5) minutes as needed for Chest Pain. ramipriL (ALTACE) 10 mg capsule   No No   Sig: TAKE 1 CAPSULE BY MOUTH ONCE DAILY AS DIRECTED   red yeast rice extract 600 mg cap Unknown at Unknown time  Yes No   Sig: Take 600 mg by mouth daily.       Facility-Administered Medications: None       DAREN Regalado

## 2021-12-29 NOTE — ROUTINE PROCESS
TRANSFER - OUT REPORT:    Verbal report given to Mayelin on Yue Franco  being transferred to ICU for routine progression of care       Report consisted of patients Situation, Background, Assessment and   Recommendations(SBAR). Information from the following report(s) SBAR, ED Summary, [facility] and Recent Results was reviewed with the receiving nurse. Lines:   Peripheral IV 12/28/21 Right Antecubital (Active)   Site Assessment Clean, dry, & intact 12/28/21 1017   Phlebitis Assessment 0 12/28/21 1017   Infiltration Assessment 0 12/28/21 1017   Dressing Status Clean, dry, & intact 12/28/21 1017   Dressing Type Transparent 12/28/21 1017   Hub Color/Line Status Pink 12/28/21 1017   Action Taken Blood drawn 12/28/21 1017   Alcohol Cap Used No 12/28/21 1017        Opportunity for questions and clarification was provided.       Patient transported with:   Monitor  Registered Nurse

## 2021-12-29 NOTE — ASSESSMENT & PLAN NOTE
-confirmed on 12/28  -CXR: Mild bibasilar subsegmental atelectasis without focal consolidation in the chest  -stable on room air  -continue supportive care  -Tylenol PRN for fever  -encourage proning as tolerated  -Isolation precautions implemented

## 2021-12-30 VITALS
TEMPERATURE: 97.8 F | RESPIRATION RATE: 15 BRPM | WEIGHT: 162 LBS | HEART RATE: 88 BPM | BODY MASS INDEX: 24.55 KG/M2 | DIASTOLIC BLOOD PRESSURE: 66 MMHG | HEIGHT: 68 IN | SYSTOLIC BLOOD PRESSURE: 140 MMHG | OXYGEN SATURATION: 98 %

## 2021-12-30 LAB
ANION GAP SERPL CALC-SCNC: 10 MMOL/L
BASOPHILS # BLD: 0 K/UL (ref 0–0.1)
BASOPHILS NFR BLD: 0 % (ref 0–2)
BUN SERPL-MCNC: 17 MG/DL (ref 9–21)
BUN/CREAT SERPL: 19
CA-I BLD-MCNC: 8.5 MG/DL (ref 8.5–10.5)
CHLORIDE SERPL-SCNC: 105 MMOL/L (ref 94–111)
CO2 SERPL-SCNC: 22 MMOL/L (ref 21–33)
CREAT SERPL-MCNC: 0.9 MG/DL (ref 0.8–1.5)
DIFFERENTIAL METHOD BLD: ABNORMAL
EOSINOPHIL # BLD: 0 K/UL (ref 0–0.4)
EOSINOPHIL NFR BLD: 0 % (ref 0–5)
ERYTHROCYTE [DISTWIDTH] IN BLOOD BY AUTOMATED COUNT: 14.5 % (ref 11.6–14.5)
GLUCOSE BLD STRIP.AUTO-MCNC: 106 MG/DL (ref 70–110)
GLUCOSE BLD STRIP.AUTO-MCNC: 119 MG/DL (ref 70–110)
GLUCOSE SERPL-MCNC: 100 MG/DL (ref 70–110)
HCT VFR BLD AUTO: 42.5 % (ref 36–48)
HGB BLD-MCNC: 14.4 G/DL (ref 13–16)
IMM GRANULOCYTES # BLD AUTO: 0 K/UL
IMM GRANULOCYTES NFR BLD AUTO: 0 %
LYMPHOCYTES # BLD: 2.5 K/UL (ref 0.9–3.6)
LYMPHOCYTES NFR BLD: 37 % (ref 21–52)
MAGNESIUM SERPL-MCNC: 1.7 MG/DL (ref 1.7–2.8)
MCH RBC QN AUTO: 35 PG (ref 24–34)
MCHC RBC AUTO-ENTMCNC: 33.9 G/DL (ref 31–37)
MCV RBC AUTO: 103.2 FL (ref 78–100)
MONOCYTES # BLD: 0.9 K/UL (ref 0.05–1.2)
MONOCYTES NFR BLD: 13 % (ref 3–10)
NEUTS BAND NFR BLD MANUAL: 1 % (ref 0–5)
NEUTS SEG # BLD: 3.3 K/UL (ref 1.8–8)
NEUTS SEG NFR BLD: 47 % (ref 40–73)
NRBC # BLD: 0 K/UL (ref 0–0.01)
NRBC BLD-RTO: 0 PER 100 WBC
OTHER CELLS NFR BLD MANUAL: 2 %
PERFORMED BY, TECHID: ABNORMAL
PERFORMED BY, TECHID: NORMAL
PHOSPHATE SERPL-MCNC: 2.7 MG/DL (ref 2.5–4.5)
PLATELET # BLD AUTO: 170 K/UL (ref 135–420)
PMV BLD AUTO: 10.6 FL (ref 9.2–11.8)
POTASSIUM SERPL-SCNC: 4.2 MMOL/L (ref 3.2–5.1)
RBC # BLD AUTO: 4.12 M/UL (ref 4.35–5.65)
RBC MORPH BLD: ABNORMAL
SODIUM SERPL-SCNC: 137 MMOL/L (ref 135–145)
WBC # BLD AUTO: 6.8 K/UL (ref 4.6–13.2)

## 2021-12-30 PROCEDURE — 80048 BASIC METABOLIC PNL TOTAL CA: CPT

## 2021-12-30 PROCEDURE — 36415 COLL VENOUS BLD VENIPUNCTURE: CPT

## 2021-12-30 PROCEDURE — 74011250637 HC RX REV CODE- 250/637: Performed by: NURSE PRACTITIONER

## 2021-12-30 PROCEDURE — 82962 GLUCOSE BLOOD TEST: CPT

## 2021-12-30 PROCEDURE — 84100 ASSAY OF PHOSPHORUS: CPT

## 2021-12-30 PROCEDURE — 85025 COMPLETE CBC W/AUTO DIFF WBC: CPT

## 2021-12-30 PROCEDURE — 83735 ASSAY OF MAGNESIUM: CPT

## 2021-12-30 RX ADMIN — CLOPIDOGREL BISULFATE 75 MG: 75 TABLET ORAL at 10:20

## 2021-12-30 RX ADMIN — ASPIRIN 81 MG: 81 TABLET, CHEWABLE ORAL at 10:20

## 2021-12-30 RX ADMIN — OSELTAMIVIR PHOSPHATE 75 MG: 75 CAPSULE ORAL at 10:20

## 2021-12-30 NOTE — PROGRESS NOTES
conducted an initial consultation and Spiritual Assessment for Jacy Espinoza, who is a [de-identified] y.o.,male. Patients Primary Language is: [place]. According to the patients EMR Confucianist Affiliation is: Quaker.     The reason the Patient came to the hospital is:   Patient Active Problem List    Diagnosis Date Noted    Syncope and collapse 12/28/2021    Seizure ([facility] [address] 12/28/2021    Pneumonia due to COVID-19 virus 12/28/2021    Influenza 12/28/2021    Prostate enlargement 06/21/2021    Encounter for hepatitis C screening test for low risk patient 06/21/2021    Controlled type 2 diabetes mellitus with complication, without long-term current use of insulin ([facility] [address] 10/30/2020    Paresthesias in right hand 10/30/2020    CAD (coronary artery disease) 04/18/2017    Essential hypertension 04/18/2017    Dyslipidemia 04/18/2017        The  provided the following Interventions:  Initiated a relationship of care and support. Explored issues of arias, spirituality and/or Lutheran needs while hospitalized. Listened empathically. Provided chaplaincy education. Provided information about Spiritual Care Services. Offered prayer and assurance of continued prayers on patient's behalf. Chart reviewed. The following outcomes were achieved:  Patient shared some information about their medical narrative and spiritual journey/beliefs. Patient processed feeling about current hospitalization. Patient expressed gratitude for the 's visit. Assessment:  Patient did not indicate any spiritual or Lutheran issues which require Spiritual Care Services interventions at this time. Patient does not have any Lutheran/cultural needs that will affect patients preferences in health care. Plan:  Chaplains will continue to follow and will provide pastoral care on an as needed or requested basis.    recommends bedside caregivers page  on duty if patient shows signs of acute spiritual or emotional distress. Per patient, feeling better. No pain at time of visit. patient admits he feel he could go home.      [address] [facility]   Staff [address] [facility]   [phone]

## 2021-12-30 NOTE — DISCHARGE INSTRUCTIONS
Patient Education        Learning About COVID-19 and Flu Symptoms  How can you tell COVID-19 from the flu? COVID-19 and the flu have similar symptoms. The two can be hard to tell apart. The only way to know for sure which illness you have is to be tested. Since the symptoms are so alike, it makes sense to act as if you have COVID-19 until your test results come back. This means staying home and limiting contact with people in your home. You'll need to wash your hands often and disinfect surfaces that you touch. And be sure to wear a mask when you're around other people. This is also good advice if you think you have the flu. COVID-19 and the flu have these symptoms in common:  · Fever or chills  · Cough  · Shortness of breath  · Fatigue (tiredness)  · Sore throat  · Runny or stuffy nose  · Muscle and body aches  · Headache  · Vomiting and diarrhea (more common in children than adults)  COVID-19 has another symptom that also may occur:  · New loss of taste or smell  COVID-19 symptoms may appear from 2 to 14 days after infection. Flu symptoms usually appear 1 to 4 days after infection. Why should you get a flu shot during the COVID-19 pandemic? It's important to get your yearly flu vaccine. Both the flu and COVID-19 can be active at the same time. You can get sick with both infections at once. And having both may make you more sick than getting just one. The flu vaccine won't protect you from COVID-19. But it can help prevent the flu or reduce its symptoms. If fewer people get very ill with the flu, this will help free up medical resources that are needed for COVID-19 patients. Where can you learn more? Go to http://www.[employer].com/  Enter C123 in the search box to learn more about \"Learning About COVID-19 and Flu Symptoms. \"  Current as of: March 26, 2021               Content Version: 13.0  © 3568-1514 [employer]wise, Incorporated.    Care instructions adapted under license by [employer] (which disclaims liability or warranty for this information). If you have questions about a medical condition or this instruction, always ask your healthcare professional. [address] any warranty or liability for your use of this information.

## 2021-12-30 NOTE — PROGRESS NOTES
Problem: Falls - Risk of  Goal: *Absence of Falls  Description: Document Edd Kelly Fall Risk and appropriate interventions in the flowsheet. Outcome: Progressing Towards Goal  Note: Fall Risk Interventions:            Medication Interventions: Patient to call before getting OOB         History of Falls Interventions: Bed/chair exit alarm         Problem: Patient Education: Go to Patient Education Activity  Goal: Patient/Family Education  Outcome: Progressing Towards Goal     Problem: Risk for Spread of Infection  Goal: Prevent transmission of infectious organism to others  Description: Prevent the transmission of infectious organisms to other patients, staff members, and visitors. Outcome: Progressing Towards Goal     Problem: Patient Education:  Go to Education Activity  Goal: Patient/Family Education  Outcome: Progressing Towards Goal     Problem: Airway Clearance - Ineffective  Goal: Achieve or maintain patent airway  Outcome: Progressing Towards Goal     Problem: Gas Exchange - Impaired  Goal: Absence of hypoxia  Outcome: Progressing Towards Goal  Goal: Promote optimal lung function  Outcome: Progressing Towards Goal     Problem: Breathing Pattern - Ineffective  Goal: Ability to achieve and maintain a regular respiratory rate  Outcome: Progressing Towards Goal     Problem:  Body Temperature -  Risk of, Imbalanced  Goal: Ability to maintain a body temperature within defined limits  Outcome: Progressing Towards Goal  Goal: Will regain or maintain usual level of consciousness  Outcome: Progressing Towards Goal  Goal: Complications related to the disease process, condition or treatment will be avoided or minimized  Outcome: Progressing Towards Goal     Problem: Isolation Precautions - Risk of Spread of Infection  Goal: Prevent transmission of infectious organism to others  Outcome: Progressing Towards Goal     Problem: Risk for Fluid Volume Deficit  Goal: Maintain normal heart rhythm  Outcome: Progressing Towards Goal  Goal: Maintain absence of muscle cramping  Outcome: Progressing Towards Goal  Goal: Maintain normal serum potassium, sodium, calcium, phosphorus, and pH  Outcome: Progressing Towards Goal     Problem: Loneliness or Risk for Loneliness  Goal: Demonstrate positive use of time alone when socialization is not possible  Outcome: Progressing Towards Goal     Problem: Fatigue  Goal: Verbalize increase energy and improved vitality  Outcome: Progressing Towards Goal     Problem: Patient Education: Go to Patient Education Activity  Goal: Patient/Family Education  Outcome: Progressing Towards Goal     Problem: Nutrition Deficit  Goal: *Optimize nutritional status  Outcome: Progressing Towards Goal

## 2021-12-30 NOTE — PROGRESS NOTES
Patient discharged with son at side. Pt has all belongings. All of his vitals have remained WNL. Attempted to call to schedule PCP follow up but did not get an answer. Pt aware to schedule a follow up appt.  Pt discharged in stable condition

## 2021-12-30 NOTE — PROGRESS NOTES
Received report assumed care     2000 Patient awake alert vitals stable denies pain remains on room air non productive cough.     0000 No complaints sleeping FSBS 119    0400 No complaints sleeping    0600 FSBS WNL no coverage needed    0700 Report to oncoming shift

## 2021-12-30 NOTE — DISCHARGE SUMMARY
Discharge Summary       PATIENT ID: Zachery Cramer  MRN: [id number]   YOB: 1940    DATE OF ADMISSION: 12/28/2021  9:58 AM    DATE OF DISCHARGE: 12/30/21    PRIMARY CARE PROVIDER: Shagufta Griffin MD     ATTENDING PHYSICIAN: Sharlene Lobato MD  DISCHARGING PROVIDER: Sharlene Lobato MD        CONSULTATIONS: None    PROCEDURES/SURGERIES: * No surgery found *    [address] COURSE:   Zachery Cramer is a [de-identified] y.o. elderly male with a past medical history for coronary artery disease with history of stents in 2016, diabetes, hypertension, hyperlipidemia, patient presented to the ED with a chief complaint of syncope and collapse. Patient reports that he was not feeling well started feeling dizziness, so he lowered his self down to the floor and does not remember much after that. Only other accompanying symptoms patient complained about is a cough. Patient family reports that he had seizure-like activity lasting only for couple of seconds and that is when they call EMS. Patient denies shortness of breath, chest pain, palpitations, nausea, vomiting, and diarrhea. Patient is vaccinated, but reports that his son but stays with them is positive for COVID-19.     In the ED patient rapid Covid was positive, patient influenza AMB was positive, BUN 27, lactic acid 2.2, CT of the head no intracranial hemorrhage mass or midline shift, there is suggestion for chronic microvascular ischemia and chronic heart, chest x-ray showed mild bibasilar subsegmental atelectasis without focal consolidation and EKG shows sinus rhythm with a first-degree AV block and a left bundle branch block that was shown on previous EKG. While in the ED patient was ordered to receive 75 mg of Tamiflu.     Admit to telemetry. Patient assessed in the ED, at the bedside, patient is alert and oriented, there is no acute distress noted.  Patient agrees to admission for a diagnosis of syncope and collapse and TIA, treatment to include neurological assessment, MRI/MRA of the brain and neck, cardiac echo in the a.m. DISCHARGE DIAGNOSES / PLAN:      Assessment & Plan:      Syncope and collapse  -TIA workup  -tele neurologist consulted in the ED  -start IV hydration  -EKG/ continuous telemetry monitoring   -check orthostatic BP  -holding BP medications at this time  -I&O  -CBC, BMP, UA  -ECHO ordered  -MRI/MRA head/neck  -CT head: No intracranial hemorrhage, mass effect, or midline shift. Shown chronic infarcts  -discontinue offending agents  -PT/OT consults   - HR dropped down to 32 per EMS, stop blocking agents and watch tele      COVID-19 virus  -confirmed on 12/28  -CXR: Mild bibasilar subsegmental atelectasis without focal consolidation in the chest  -stable on room air  -continue supportive care  -Tylenol PRN for fever  -encourage proning as tolerated  -Isolation precautions implemented     Seizure like activity  - no post ictal period, never had sz prior, ct w/o mass  - stongly suspect pt had syncope, with associated shaking, NOT sz  - furthermore, his sycope was preceeded by lightheadedness, and relieved the first time by sitting down  - no focal neuro findings, I feel that MRI is not neceasrry at time time, even more so b/c he is currently infected by covid and influenza     Dyslipidemia  -chronic  -continue Lipitor     Essential hypertension - currently controlled off meds  -Chronic/controlled  -monitor BP and HR closely     Controlled type 2 diabetes mellitus with complication, without long-term current use of insulin (HCC)  -holding oral antidiabetic medications  -accuchecks priors to meals and bedtime  -implement sliding scale     CAD (coronary artery disease)  -history of 4 stents in 2016  -continue ASA, Plavix, and statin     Influenza  -patient positive for Influenza A and B  -patient is afebrile, only symptom is a cough  -started Tamiflu        Day of dc  Pt feels well  He is doing well off of ace, norvasc and bb.   I suggest staying off these until 1 week follow up, as I suspect some component of hypotension or bradycardia contributed to his syncope. I do not feel that he had a seizure or a stroke  He has no symtpoms of either flu or covid, but he understands he has both, and needs to quarantine for 10 days from onset of symptoms. His wife and son already have it at home as well  Total dc time 35 mins    FOLLOW UP APPOINTMENTS:    Follow-up Information     Follow up With Specialties Details Why Contact Info    Danial Riley MD Family Medicine In 1 week  [address]  [phone]               DIET: cardiac, diabetes      DISCHARGE MEDICATIONS:  Current Discharge Medication List      CONTINUE these medications which have NOT CHANGED    Details   atorvastatin (LIPITOR) 10 mg tablet Take 1 tablet by mouth once daily  Qty: 90 Tablet, Refills: 3    Associated Diagnoses: Coronary artery disease involving native coronary artery of native heart without angina pectoris      metFORMIN (GLUCOPHAGE) 500 mg tablet Take 1/2 (one-half) tablet by mouth once daily  Qty: 45 Tablet, Refills: 0    Associated Diagnoses: Controlled type 2 diabetes mellitus with complication, without long-term current use of insulin (HCC)      red yeast rice extract 600 mg cap Take 600 mg by mouth daily. nitroglycerin (NITROSTAT) 0.4 mg SL tablet 1 Tab by SubLINGual route every five (5) minutes as needed for Chest Pain. Qty: 25 Tab, Refills: 3      aspirin 81 mg chewable tablet Take 81 mg by mouth daily.          STOP taking these medications       clopidogreL (PLAVIX) 75 mg tab Comments:   Reason for Stopping:         metoprolol succinate (TOPROL-XL) 25 mg XL tablet Comments:   Reason for Stopping:         ramipriL (ALTACE) 10 mg capsule Comments:   Reason for Stopping:         amLODIPine (NORVASC) 2.5 mg tablet Comments:   Reason for Stopping:                 NOTIFY YOUR PHYSICIAN FOR ANY OF THE FOLLOWING:   Fever over 101 degrees for 24 hours. Chest pain, shortness of breath, fever, chills, nausea, vomiting, diarrhea, change in mentation, falling, weakness, bleeding. Severe pain or pain not relieved by medications. Or, any other signs or symptoms that you may have questions about. PHYSICAL EXAMINATION AT DISCHARGE:  General:          Alert, cooperative, no distress, appears stated age. HEENT:           Atraumatic, anicteric sclerae, pink conjunctivae                          No oral ulcers, mucosa moist, throat clear, dentition fair  Neck:               Supple, symmetrical  Lungs:             Clear to auscultation bilaterally. No Wheezing or Rhonchi. No rales. Chest wall:      No tenderness  No Accessory muscle use. Heart:              Regular  rhythm,  No  murmur   No edema  Abdomen:        Soft, non-tender. Not distended. Bowel sounds normal  Extremities:     No cyanosis. No clubbing,                            Skin turgor normal, Capillary refill normal  Skin:                Not pale. Not Jaundiced  No rashes   Psych:             Not anxious or agitated.   Neurologic:      Alert, moves all extremities, answers questions appropriately and responds to commands       CHRONIC MEDICAL DIAGNOSES:  Problem List as of 12/30/2021 Date Reviewed: 11/19/2021          Codes Class Noted - Resolved    * (Principal) Syncope and collapse ICD-10-CM: R55  ICD-9-CM: 780.2  12/28/2021 - Present        Seizure ([address] ICD-10-CM: R56.9  ICD-9-CM: 780.39  12/28/2021 - Present        Pneumonia due to COVID-19 virus ICD-10-CM: U07.1, J12.82  ICD-9-CM: 480.8, 079.89  12/28/2021 - Present        Influenza ICD-10-CM: J11.1  ICD-9-CM: 487.1  12/28/2021 - Present        Prostate enlargement ICD-10-CM: N40.0  ICD-9-CM: 600.00  6/21/2021 - Present        Encounter for hepatitis C screening test for low risk patient ICD-10-CM: Z11.59  ICD-9-CM: V73.89  6/21/2021 - Present        Controlled type 2 diabetes mellitus with complication, without long-term current use of insulin ([facility] ICD-10-CM: E11.8  ICD-9-CM: 250.90  10/30/2020 - Present        Paresthesias in right hand ICD-10-CM: R20.2  ICD-9-CM: 782.0  10/30/2020 - Present        CAD (coronary artery disease) ICD-10-CM: I25.10  ICD-9-CM: 414.00  4/18/2017 - Present        Essential hypertension ICD-10-CM: I10  ICD-9-CM: 401.9  4/18/2017 - Present        Dyslipidemia ICD-10-CM: E78.5  ICD-9-CM: 272.4  4/18/2017 - Present        RESOLVED: Syncope ICD-10-CM: R55  ICD-9-CM: 780.2  12/28/2021 - 12/28/2021        RESOLVED: Influenza A ICD-10-CM: J10.1  ICD-9-CM: 487.1  12/28/2021 - 12/28/2021        RESOLVED: Influenza B ICD-10-CM: J10.1  ICD-9-CM: 487.1  12/28/2021 - 12/28/2021        RESOLVED: ACS (acute coronary syndrome) ([facility] ICD-10-CM: I24.9  ICD-9-CM: 411.1  12/27/2016 - 12/29/2016              Greater than 35 minutes were spent with the patient on counseling and coordination of care    Signed:   Chon Mireles MD  12/30/2021  11:24 AM

## 2021-12-30 NOTE — PROGRESS NOTES
Problem: Falls - Risk of  Goal: *Absence of Falls  Description: Document Lilly Morris Fall Risk and appropriate interventions in the flowsheet. 12/30/2021 1154 by Meredith Benitez LPN  Outcome: Resolved/Met  Note: Fall Risk Interventions:            Medication Interventions: Patient to call before getting OOB         History of Falls Interventions: Bed/chair exit alarm,Room close to nurse's station      12/30/2021 0740 by Meredith Benitez LPN  Outcome: Progressing Towards Goal  Note: Fall Risk Interventions:            Medication Interventions: Patient to call before getting OOB         History of Falls Interventions: Bed/chair exit alarm         Problem: Patient Education: Go to Patient Education Activity  Goal: Patient/Family Education  12/30/2021 1154 by Meredith Benitez LPN  Outcome: Resolved/Met  12/30/2021 0740 by Meredith Benitez LPN  Outcome: Progressing Towards Goal     Problem: Risk for Spread of Infection  Goal: Prevent transmission of infectious organism to others  Description: Prevent the transmission of infectious organisms to other patients, staff members, and visitors.   12/30/2021 1154 by Meredith Benitez LPN  Outcome: Resolved/Met  12/30/2021 0740 by Meredith Benitez LPN  Outcome: Progressing Towards Goal     Problem: Patient Education:  Go to Education Activity  Goal: Patient/Family Education  12/30/2021 1154 by Meredith Benitez LPN  Outcome: Resolved/Met  12/30/2021 0740 by Meredith Benitez LPN  Outcome: Progressing Towards Goal     Problem: Airway Clearance - Ineffective  Goal: Achieve or maintain patent airway  12/30/2021 1154 by Meredith Benitez LPN  Outcome: Resolved/Met  12/30/2021 0740 by Meredith Benitez LPN  Outcome: Progressing Towards Goal     Problem: Gas Exchange - Impaired  Goal: Absence of hypoxia  12/30/2021 1154 by Meredith Benitez LPN  Outcome: Resolved/Met  12/30/2021 0740 by Meredith Benitez LPN  Outcome: Progressing Towards Goal  Goal: Promote optimal lung function  12/30/2021 1154 by Erik Hernandez LPN  Outcome: Resolved/Met  12/30/2021 0740 by Erik Hernandez LPN  Outcome: Progressing Towards Goal     Problem: Breathing Pattern - Ineffective  Goal: Ability to achieve and maintain a regular respiratory rate  12/30/2021 1154 by Erik Hernandez LPN  Outcome: Resolved/Met  12/30/2021 0740 by Erik Hernandez LPN  Outcome: Progressing Towards Goal     Problem:  Body Temperature -  Risk of, Imbalanced  Goal: Ability to maintain a body temperature within defined limits  12/30/2021 1154 by Erik Hernandez LPN  Outcome: Resolved/Met  12/30/2021 0740 by Erik Hernandez LPN  Outcome: Progressing Towards Goal  Goal: Will regain or maintain usual level of consciousness  12/30/2021 1154 by Erik eHrnandez LPN  Outcome: Resolved/Met  12/30/2021 0740 by Erik Hernandez LPN  Outcome: Progressing Towards Goal  Goal: Complications related to the disease process, condition or treatment will be avoided or minimized  12/30/2021 1154 by Erik Hernandez LPN  Outcome: Resolved/Met  12/30/2021 0740 by Erik Hernandez LPN  Outcome: Progressing Towards Goal     Problem: Isolation Precautions - Risk of Spread of Infection  Goal: Prevent transmission of infectious organism to others  12/30/2021 1154 by Erik Hernandez LPN  Outcome: Resolved/Met  12/30/2021 0740 by Erik Hernandez LPN  Outcome: Progressing Towards Goal     Problem: Risk for Fluid Volume Deficit  Goal: Maintain normal heart rhythm  12/30/2021 1154 by Erik Hernandez LPN  Outcome: Resolved/Met  12/30/2021 0740 by Erik Hernandez LPN  Outcome: Progressing Towards Goal  Goal: Maintain absence of muscle cramping  12/30/2021 1154 by Erik Hernandez LPN  Outcome: Resolved/Met  12/30/2021 0740 by Erik Hernandez LPN  Outcome: Progressing Towards Goal  Goal: Maintain normal serum potassium, sodium, calcium, phosphorus, and pH  12/30/2021 1154 by Erik Hernandez LPN  Outcome: Resolved/Met  12/30/2021 0740 by Erik Hernandez LPN  Outcome: Progressing Towards Goal     Problem: Loneliness or Risk for Loneliness  Goal: Demonstrate positive use of time alone when socialization is not possible  12/30/2021 1154 by Valeriy Pierre LPN  Outcome: Resolved/Met  12/30/2021 0740 by Valeriy Pierre LPN  Outcome: Progressing Towards Goal     Problem: Fatigue  Goal: Verbalize increase energy and improved vitality  12/30/2021 1154 by Valeriy Pierre LPN  Outcome: Resolved/Met  12/30/2021 0740 by Valeriy Pierre LPN  Outcome: Progressing Towards Goal     Problem: Patient Education: Go to Patient Education Activity  Goal: Patient/Family Education  12/30/2021 1154 by Valeriy Pierre LPN  Outcome: Resolved/Met  12/30/2021 0740 by Valeriy Pierre LPN  Outcome: Progressing Towards Goal     Problem: Nutrition Deficit  Goal: *Optimize nutritional status  12/30/2021 1154 by Valeriy Pierre LPN  Outcome: Resolved/Met  12/30/2021 0740 by Valeriy Pierre LPN  Outcome: Progressing Towards Goal

## 2021-12-31 NOTE — PROGRESS NOTES
Physician Progress Note      PATIENT:               Sari Cortes  [facility] #:                  [id number]  :                       1940  ADMIT DATE:       2021 9:58 AM  DISCH DATE:        2021 12:51 PM  RESPONDING  PROVIDER #:        Sampson Perez MD          QUERY TEXT:    Dear  Hospitalist  Patient admitted with syncope noted to have Covid  19  virus. If possible, please document in progress notes and discharge summary if you are evaluating and/or treating any of the following: The medical record reflects the following:  Risk Factors: advanced age, multiple  chronic  problems  Clinical Indicators:  syncope was preceded by lightheadedness, and relieved the first time by sitting down  Treatment: tele neuro consult;  CT head- neg. Thank you,  Kenji Montes RN  CCDS  x [phone]  Options provided:  -- Syncope due to TIA  -- Syncope  due to  Covid  19  virus  -- Syncope due to other cause  -- Other - I will add my own diagnosis  -- Disagree - Not applicable / Not valid  -- Disagree - Clinically unable to determine / Unknown  -- Refer to Clinical Documentation Reviewer    PROVIDER RESPONSE TEXT:    The syncope is due to other cause# please document cause.     Query created by: Aissatou Partida on 2021 7:17 AM      Electronically signed by:  Sampson Perez MD 2021 3:03 PM

## 2022-01-07 NOTE — PROGRESS NOTES
Physician Progress Note      PATIENT:               Rufino Seaman  [facility] #:                  [id number]  :                       1940  ADMIT DATE:       2021 9:58 AM  DISCH DATE:        2021 12:51 PM  RESPONDING  PROVIDER #:        José Miguel Philip MD          QUERY TEXT:    Thank you Dr Gabriel Shepard   for your response,  could you please  clarify  what  the syncope was due to ? Thank you  [facility]    Dear  Dr. Gabriel Shepard  Patient admitted with syncope noted to have Covid  19  virus. If possible, please document in progress notes and discharge summary if you are evaluating and/or treating any of the following: The medical record reflects the following:  Risk Factors: advanced age, multiple  chronic  problems, COVID 23  Clinical Indicators:  DS:  Seizure?like activity  - no post ictal period, never had sz prior, ct w/o mass  - stongly suspect pt had syncope, with associated shaking, NOT sz  - furthermore, his sycope was preceeded by lightheadedness, and relieved the first time by sitting down  - no focal neuro findings, I feel that MRI is not neceasrry at time time, even more so b/c he is currently infected by covid and influenza  Treatment: tele neuro consult;  CT head- neg. Thank you,  Balbina Short RN  CCDS  x [phone]  Options provided:  -- Syncope due to TIA  -- Syncope  due to  Covid  19  virus  -- Other - I will add my own diagnosis  -- Disagree - Not applicable / Not valid  -- Disagree - Clinically unable to determine / Unknown  -- Refer to Clinical Documentation Reviewer    PROVIDER RESPONSE TEXT:    This patient has syncope due to Covid 19 virus. Query created by:  Dolores Henry on 1/3/2022 8:54 AM      Electronically signed by:  José Miguel Philip MD 2022 12:02 PM

## 2022-02-10 ENCOUNTER — OFFICE VISIT (OUTPATIENT)
Dept: FAMILY MEDICINE CLINIC | Age: 82
End: 2022-02-10
Payer: MEDICARE

## 2022-02-10 VITALS
HEART RATE: 83 BPM | SYSTOLIC BLOOD PRESSURE: 157 MMHG | BODY MASS INDEX: 24.14 KG/M2 | OXYGEN SATURATION: 99 % | DIASTOLIC BLOOD PRESSURE: 84 MMHG | TEMPERATURE: 98 F | WEIGHT: 159.3 LBS | HEIGHT: 68 IN

## 2022-02-10 DIAGNOSIS — I10 ESSENTIAL HYPERTENSION: ICD-10-CM

## 2022-02-10 DIAGNOSIS — R26.9 ABNORMAL GAIT: ICD-10-CM

## 2022-02-10 DIAGNOSIS — R42 DIZZINESS: Primary | ICD-10-CM

## 2022-02-10 DIAGNOSIS — R56.9 SEIZURE-LIKE ACTIVITY (HCC): ICD-10-CM

## 2022-02-10 DIAGNOSIS — E11.8 CONTROLLED TYPE 2 DIABETES MELLITUS WITH COMPLICATION, WITHOUT LONG-TERM CURRENT USE OF INSULIN (HCC): ICD-10-CM

## 2022-02-10 PROCEDURE — 99214 OFFICE O/P EST MOD 30 MIN: CPT | Performed by: STUDENT IN AN ORGANIZED HEALTH CARE EDUCATION/TRAINING PROGRAM

## 2022-02-10 NOTE — PROGRESS NOTES
Diallo Shaver presents today for   Chief Complaint   Patient presents with   [facility] Establish Care    Dizziness       Is someone accompanying this pt? No     Is the patient using any DME equipment during OV? No     Depression Screening:  3 most recent PHQ Screens 2/10/2022   Little interest or pleasure in doing things Not at all   Feeling down, depressed, irritable, or hopeless Not at all   Total Score PHQ 2 0       Learning Assessment:  Learning Assessment 10/30/2020   PRIMARY LEARNER Patient   PRIMARY LANGUAGE ENGLISH   LEARNER PREFERENCE PRIMARY VIDEOS   ANSWERED BY patient   RELATIONSHIP SELF       Fall Risk  Fall Risk Assessment, last 12 mths 11/19/2021   Able to walk? Yes   Fall in past 12 months? 0   Do you feel unsteady? 0   Are you worried about falling 0   Number of falls in past 12 months -   Fall with injury? -       Health Maintenance reviewed and discussed and ordered per Provider. Health Maintenance Due   Topic Date Due    Foot Exam Q1  Never done    Eye Exam Retinal or Dilated  Never done    DTaP/Tdap/Td series (1 - Tdap) Never done    Shingrix Vaccine Age 50> (1 of 2) Never done    Pneumococcal 65+ years (1 of 1 - PPSV23) Never done    Flu Vaccine (1) Never done    COVID-19 Vaccine (3 - Booster for Moderna series) 11/18/2021   . Coordination of Care:    1. \"Have you been to the ER, urgent care clinic since your last visit? Hospitalized since your last visit? \" Yes When: 12/28/21 Where: [facility]  Reason for visit: pt went for seizure     2. \"Have you seen or consulted any other health care providers outside of the [address] [place] [address] since your last visit? \" No     3. For patients aged 39-70: Has the patient had a colonoscopy? Yes - no Care Gap present     If the patient is female:    4. For patients aged 41-77: Has the patient had a mammogram within the past 2 years? NA - based on age/sex    5. For patients aged 21-65: Has the patient had a pap smear?  NA - based on age/sex

## 2022-02-14 NOTE — PROGRESS NOTES
Subjective: Shaniqua Espinoza is a [de-identified] y.o. male who was seen for Establish Care and Dizziness    She was recently hospitalized for seizure-like activity. No MRI was done and he does not have a neurology referral.  Currently not taking any seizure medications. He states he has not had any seizure-like activity since then but still has abnormal balance issues. His chronic dizziness as well. He is not taking any blood pressure medications and his blood pressure is within normal limits at home. Home Medications    Medication Sig Start Date End Date Taking? Authorizing Provider   atorvastatin (LIPITOR) 10 mg tablet Take 1 tablet by mouth once daily 6/21/21  Yes Ty Lynch MD   red yeast rice extract 600 mg cap Take 600 mg by mouth daily. Yes Provider, Historical   nitroglycerin (NITROSTAT) 0.4 mg SL tablet 1 Tab by SubLINGual route every five (5) minutes as needed for Chest Pain. 1/30/17  Yes Robby Ballseteros P, PRABHU   aspirin 81 mg chewable tablet Take 81 mg by mouth daily. Yes Provider, Historical      No Known Allergies  Social History     Tobacco Use    Smoking status: Never Smoker    Smokeless tobacco: Never Used   Vaping Use    Vaping Use: Never used   Substance Use Topics    Alcohol use: No    Drug use: No            Review of Systems   Neurological: Positive for dizziness and seizures. All other systems reviewed and are negative.          Objective:     Visit Vitals  BP (!) 157/84 (BP 1 Location: Left upper arm, BP Patient Position: Sitting, BP Cuff Size: Adult)   Pulse 83   Temp 98 °F (36.7 °C) (Oral)   Ht 5' 8\" (1.727 m)   Wt 159 lb 4.8 oz (72.3 kg)   SpO2 99%   BMI 24.22 kg/m²        General: alert, oriented, not in distress  Head: scalp normal, atraumatic  Eyes: pupils are equal and reactive, full and intact EOM's  Ears: patent ear canal, intact tympanic membrane  Nose: normal turbinates, no congestion or discharge  Lips/Mouth: moist lips and buccal mucosa, non-enlarged tonsils, pink throat  Neck: supple, no JVD, no lymphadenopathy, non-palpable thyroid  Chest/Lungs: clear breath sounds, no wheezing or crackles  Heart: normal rate, regular rhythm, no murmur  Abdomen: soft, non-distended, non-tender, normal bowel sounds, no organomegaly, no masses  Extremities: no focal deformities, no edema  Skin: no active skin lesions    Laboratory/Tests:  No results displayed because visit has over 200 results.       Ancillary Procedure on 07/06/2021   Component Date Value Ref Range Status    IVSd 07/06/2021 1.02* 0.60 - 1.00 cm Final    LVIDd 07/06/2021 3.38* 4.20 - 5.90 cm Final    LVIDs 07/06/2021 2.28  cm Final    LVOT Diameter 07/06/2021 1.79  cm Final    LVPWd 07/06/2021 0.96  0.60 - 1.00 cm Final    LVOT Peak Gradient 07/06/2021 2.38  mmHg Final    Left Ventricular Outflow Tract [place]* 07/06/2021 1.34  mmHg Final    LVOT SV 07/06/2021 46.7  mL Final    LVOT Peak Velocity 07/06/2021 77.10  cm/s Final    LVOT VTI 07/06/2021 18.57  cm Final    LA Volume BP 07/06/2021 54.73  18.0 - 58.0 mL Final    LA Area 4C 07/06/2021 16.75  cm2 Final    LA Volume 2C 07/06/2021 61.37* 18.00 - 58.00 mL Final    LA Volume 4C 07/06/2021 38.77  18.00 - 58.00 mL Final    MV A Velocity 07/06/2021 87.58  cm/s Final    MV E Wave Deceleration Time 07/06/2021 256.91  ms Final    MV E Velocity 07/06/2021 87.85  cm/s Final    E/E' Ratio (Averaged) 07/06/2021 13.36   Final    E/E' Lateral 07/06/2021 11.86   Final    E/E' Septal 07/06/2021 14.86   Final    LV E' Lateral Velocity 07/06/2021 7.41  cm/s Final    LV E' Septal Velocity 07/06/2021 5.91  cm/s Final    TR Peak Gradient 07/06/2021 23.73  mmHg Final    TR Max Velocity 07/06/2021 243.57  cm/s Final    Aortic Root 07/06/2021 3.78  cm Final    MV E/A 07/06/2021 1.00   Final    LV Mass 2D 07/06/2021 96.6  88 - 224 g Final    LV Mass 2D Index 07/06/2021 50.8  49 - 115 g/m2 Final    LA Volume Index BP 07/06/2021 28.81  16 - 28 ml/m2 Final    LA Volume Index 2C 07/06/2021 32.30  16 - 28 ml/m2 Final    LA Volume Index 4C 07/06/2021 20.41  16 - 28 ml/m2 Final   Ancillary Procedure on 07/06/2021   Component Date Value Ref Range Status    Stress Target HR 07/06/2021 141  bpm Final    Exercise Duration Time 07/06/2021 00:03:00   Final    Stress Systolic BP 46/73/7725 780  mmHg Final    Stress Diastolic BP 75/60/3295 70  mmHg Final    Stress Peak HR 07/06/2021 93  BPM Final    Baseline HR 07/06/2021 66  BPM Final    Stress Estimated Workload 07/06/2021 1.0  METS Final    Baseline Systolic BP 71/41/0586 874  mmHg Final    Stress Percent HR Achieved 07/06/2021 66  % Final    Stress ST Elevation 07/06/2021 0  mm Final    Stress ST Depression 07/06/2021 0  mm Final    Baseline Diastolic BP 26/36/0117 70  mmHg Final    Stress Rate Pressure Product 07/06/2021 12,090  BPM*mmHg Final    Stress Stage 1 Duration 07/06/2021 3  min:sec Final    Stress Stage 1 HR 07/06/2021 81  bpm Final    Stress Stage 1 BP 07/06/2021 112/58  mmHg Final    Recovery Stage 1 Duration 07/06/2021 1:8  min:sec Final    Recovery Stage 1 HR 07/06/2021 53  bpm Final    Recovery Stage 1 BP 07/06/2021 80/40  mmHg Final    Recovery Stage 2 Duration 07/06/2021 3:14  min:sec Final    Recovery Stage 2 HR 07/06/2021 62  bpm Final    Recovery Stage 2 BP 07/06/2021 106/50  mmHg Final    Recovery Stage 3 Duration 07/06/2021 6  min:sec Final    Recovery Stage 3 HR 07/06/2021 66  bpm Final    Recovery Stage 3 BP 07/06/2021 110/60  mmHg Final   Hospital Outpatient Visit on 06/25/2021   Component Date Value Ref Range Status    Hepatitis C virus Ab 06/25/2021 0.02  Index Final    HIDE    Hep C virus Ab Interp. 06/25/2021 NONREACTIVE  NONREACTIVE Final    Hep C  virus Ab comment 06/25/2021 Method used is [facility]   Final    Prostate Specific Ag 06/25/2021 0.8  0.01 - 4.0 ng/mL Final    Comment: Method used is [employer]  (NOTE)  Many types of test methods are used to measure PSA and can yield   different results on any given specimen. Therefore PSA results from   different laboratories on the same patient are not directly   comparable. In addition, PSA values by themselves should not be   interpreted as the presence or absence of cancer. PSA values used to   monitor for biochemical recurrence of prostate cancer should be   interpreted in accordance with current clinical guidelines (e.g. the   2013 American Urological Association (AUA) guidelines and the 2015    Association of Urology (EAU) guidelines).  Microalbumin,urine random 06/25/2021 0.61  mg/dL Final    No reference range has been established.  Creatinine, urine 06/25/2021 40.20  mg/dL Final    No reference range has been established.  Microalbumin/Creat ratio (mg/g cre* 06/25/2021 15  0 - 30 mgMALB/gCRE Final   Office Visit on 06/21/2021   Component Date Value Ref Range Status    Hemoglobin A1c (POC) 06/21/2021 5.8  % Final         Assessment & Plan:     1. Dizziness  Referral to PT for vestibular therapy  - REFERRAL TO PHYSICAL THERAPY    2. Controlled type 2 diabetes mellitus with complication, without long-term current use of insulin (HCC)  Not on medication    3. Seizure-like activity (HCC)  We will obtain MRI of brain and referral to neurology for possible EEG  - MRI BRAIN WO CONT; Future  - REFERRAL TO NEUROLOGY    4. Abnormal gait  Refer to #3  - MRI BRAIN WO CONT; Future  - REFERRAL TO NEUROLOGY  - REFERRAL TO PHYSICAL THERAPY    5. Essential hypertension  Not on medication secondary to dizziness. Well controlled currently. I have discussed the diagnosis with the patient and the intended plan as seen in the above orders. The patient has received an after-visit summary and questions were answered concerning future plans. I have discussed medication side effects and warnings with the patient as well.  I have reviewed the plan of care with the patient, accepted their input and they are in agreement with the treatment goals. Previous lab and imaging results were reviewed by me.        Win Degroot MD  February 14, 2022

## 2022-02-21 ENCOUNTER — HOSPITAL ENCOUNTER (OUTPATIENT)
Dept: MRI IMAGING | Age: 82
Discharge: HOME | End: 2022-02-21
Attending: STUDENT IN AN ORGANIZED HEALTH CARE EDUCATION/TRAINING PROGRAM
Payer: MEDICARE

## 2022-02-21 DIAGNOSIS — R56.9 SEIZURE-LIKE ACTIVITY (HCC): ICD-10-CM

## 2022-02-21 DIAGNOSIS — R26.9 ABNORMAL GAIT: ICD-10-CM

## 2022-02-21 PROCEDURE — 70551 MRI BRAIN STEM W/O DYE: CPT

## 2022-03-09 ENCOUNTER — TRANSCRIBE ORDER (OUTPATIENT)
Dept: SCHEDULING | Age: 82
End: 2022-03-09

## 2022-03-09 DIAGNOSIS — M47.12 CERVICAL SPONDYLOSIS WITH MYELOPATHY: Primary | ICD-10-CM

## 2022-03-18 PROBLEM — R55 SYNCOPE AND COLLAPSE: Status: ACTIVE | Noted: 2021-12-28

## 2022-03-18 PROBLEM — J11.1 INFLUENZA: Status: ACTIVE | Noted: 2021-12-28

## 2022-03-18 PROBLEM — N40.0 PROSTATE ENLARGEMENT: Status: ACTIVE | Noted: 2021-06-21

## 2022-03-18 PROBLEM — E11.8 CONTROLLED TYPE 2 DIABETES MELLITUS WITH COMPLICATION, WITHOUT LONG-TERM CURRENT USE OF INSULIN (HCC): Status: ACTIVE | Noted: 2020-10-30

## 2022-03-18 PROBLEM — I10 ESSENTIAL HYPERTENSION: Status: ACTIVE | Noted: 2017-04-18

## 2022-03-19 PROBLEM — I25.10 CAD (CORONARY ARTERY DISEASE): Status: ACTIVE | Noted: 2017-04-18

## 2022-03-19 PROBLEM — Z11.59 ENCOUNTER FOR HEPATITIS C SCREENING TEST FOR LOW RISK PATIENT: Status: ACTIVE | Noted: 2021-06-21

## 2022-03-19 PROBLEM — U07.1 PNEUMONIA DUE TO COVID-19 VIRUS: Status: ACTIVE | Noted: 2021-12-28

## 2022-03-19 PROBLEM — E78.5 DYSLIPIDEMIA: Status: ACTIVE | Noted: 2017-04-18

## 2022-03-19 PROBLEM — R20.2 PARESTHESIAS IN RIGHT HAND: Status: ACTIVE | Noted: 2020-10-30

## 2022-03-19 PROBLEM — J12.82 PNEUMONIA DUE TO COVID-19 VIRUS: Status: ACTIVE | Noted: 2021-12-28

## 2022-03-23 ENCOUNTER — HOSPITAL ENCOUNTER (OUTPATIENT)
Dept: MRI IMAGING | Age: 82
Discharge: HOME | End: 2022-03-23
Payer: MEDICARE

## 2022-03-23 DIAGNOSIS — M47.12 CERVICAL SPONDYLOSIS WITH MYELOPATHY: ICD-10-CM

## 2022-03-23 PROCEDURE — 72141 MRI NECK SPINE W/O DYE: CPT

## 2022-05-10 ENCOUNTER — OFFICE VISIT (OUTPATIENT)
Dept: FAMILY MEDICINE CLINIC | Age: 82
End: 2022-05-10
Payer: MEDICARE

## 2022-05-10 VITALS
HEART RATE: 91 BPM | HEIGHT: 68 IN | BODY MASS INDEX: 24.8 KG/M2 | TEMPERATURE: 98.3 F | DIASTOLIC BLOOD PRESSURE: 83 MMHG | SYSTOLIC BLOOD PRESSURE: 147 MMHG | OXYGEN SATURATION: 98 % | WEIGHT: 163.6 LBS

## 2022-05-10 DIAGNOSIS — I63.9 CEREBELLAR INFARCTION (HCC): Primary | ICD-10-CM

## 2022-05-10 PROCEDURE — G8420 CALC BMI NORM PARAMETERS: HCPCS | Performed by: STUDENT IN AN ORGANIZED HEALTH CARE EDUCATION/TRAINING PROGRAM

## 2022-05-10 PROCEDURE — G8536 NO DOC ELDER MAL SCRN: HCPCS | Performed by: STUDENT IN AN ORGANIZED HEALTH CARE EDUCATION/TRAINING PROGRAM

## 2022-05-10 PROCEDURE — 1101F PT FALLS ASSESS-DOCD LE1/YR: CPT | Performed by: STUDENT IN AN ORGANIZED HEALTH CARE EDUCATION/TRAINING PROGRAM

## 2022-05-10 PROCEDURE — G8754 DIAS BP LESS 90: HCPCS | Performed by: STUDENT IN AN ORGANIZED HEALTH CARE EDUCATION/TRAINING PROGRAM

## 2022-05-10 PROCEDURE — G8427 DOCREV CUR MEDS BY ELIG CLIN: HCPCS | Performed by: STUDENT IN AN ORGANIZED HEALTH CARE EDUCATION/TRAINING PROGRAM

## 2022-05-10 PROCEDURE — G8753 SYS BP > OR = 140: HCPCS | Performed by: STUDENT IN AN ORGANIZED HEALTH CARE EDUCATION/TRAINING PROGRAM

## 2022-05-10 PROCEDURE — 99214 OFFICE O/P EST MOD 30 MIN: CPT | Performed by: STUDENT IN AN ORGANIZED HEALTH CARE EDUCATION/TRAINING PROGRAM

## 2022-05-10 PROCEDURE — G8510 SCR DEP NEG, NO PLAN REQD: HCPCS | Performed by: STUDENT IN AN ORGANIZED HEALTH CARE EDUCATION/TRAINING PROGRAM

## 2022-05-10 NOTE — PROGRESS NOTES
Juan Harkins presents today for   Chief Complaint   Patient presents with    Follow-up     still off balance, eyes is still the name, Dizziness has improved        Is someone accompanying this pt? No     Is the patient using any DME equipment during OV? No     Depression Screening:  3 most recent PHQ Screens 5/10/2022   Little interest or pleasure in doing things Not at all   Feeling down, depressed, irritable, or hopeless Not at all   Total Score PHQ 2 0       Learning Assessment:  Learning Assessment 10/30/2020   PRIMARY LEARNER Patient   PRIMARY LANGUAGE ENGLISH   LEARNER PREFERENCE PRIMARY VIDEOS   ANSWERED BY patient   RELATIONSHIP SELF       Fall Risk  Fall Risk Assessment, last 12 mths 11/19/2021   Able to walk? Yes   Fall in past 12 months? 0   Do you feel unsteady? 0   Are you worried about falling 0   Number of falls in past 12 months -   Fall with injury? -       Health Maintenance reviewed and discussed and ordered per Provider. Health Maintenance Due   Topic Date Due    Pneumococcal 65+ years (1 - PCV) Never done    Foot Exam Q1  Never done    Eye Exam Retinal or Dilated  Never done    DTaP/Tdap/Td series (1 - Tdap) Never done    Shingrix Vaccine Age 50> (1 of 2) Never done    COVID-19 Vaccine (3 - Booster for Moderna series) 11/18/2021   . Coordination of Care:    1. \"Have you been to the ER, urgent care clinic since your last visit? Hospitalized since your last visit? \" No    2. \"Have you seen or consulted any other health care providers outside of the [address] [place] [address] since your last visit? \" No     3. For patients aged 39-70: Has the patient had a colonoscopy? NA - based on age     If the patient is female:    4. For patients aged 41-77: Has the patient had a mammogram within the past 2 years? NA - based on age/sex    5. For patients aged 21-65: Has the patient had a pap smear?  NA - based on age/sex

## 2022-05-16 NOTE — PROGRESS NOTES
Subjective: Curtis Mcburney is a [de-identified] y.o. male who was seen for Follow-up (still off balance, eyes is still the name, Dizziness has improved )    Patient still has gait abnormalities. MRI showed chronic cerebellar infarction. He states he has not had his carotid arteries checked recently. Follows with cardiology. Home Medications    Medication Sig Start Date End Date Taking? Authorizing Provider   atorvastatin (LIPITOR) 10 mg tablet Take 1 tablet by mouth once daily 6/21/21  Yes Jennifer Florian MD   nitroglycerin (NITROSTAT) 0.4 mg SL tablet 1 Tab by SubLINGual route every five (5) minutes as needed for Chest Pain. 1/30/17  Yes Marva PENA, NP   aspirin 81 mg chewable tablet Take 81 mg by mouth daily. Yes Provider, Historical   red yeast rice extract 600 mg cap Take 600 mg by mouth daily. Patient not taking: Reported on 5/10/2022    Provider, Historical      No Known Allergies  Social History     Tobacco Use    Smoking status: Never Smoker    Smokeless tobacco: Never Used   Vaping Use    Vaping Use: Never used   Substance Use Topics    Alcohol use: No    Drug use: No        Review of Systems   All other systems reviewed and are negative. Objective:     Visit Vitals  BP (!) 147/83 (BP 1 Location: Left upper arm, BP Patient Position: Sitting, BP Cuff Size: Adult)   Pulse 91   Temp 98.3 °F (36.8 °C) (Temporal)   Ht 5' 8\" (1.727 m)   Wt 163 lb 9.6 oz (74.2 kg)   SpO2 98%   BMI 24.88 kg/m²        General: alert, oriented, not in distress  Chest/Lungs: clear breath sounds, no wheezing or crackles  Heart: normal rate, regular rhythm, no murmur  Abdomen: soft, non-distended, non-tender, normal bowel sounds, no organomegaly, no masses  Extremities: no focal deformities, no edema  Skin: no active skin lesions      Assessment & Plan:     1. Cerebellar infarction ([address]  Still symptomatic after PT. Continue aspirin, atorvastatin. Will obtain MRA head and neck to rule out vascular etiology. Advised patient to follow up with cardiology for possible holter monitor.   - MRA BRAIN W WO CONT; Future  - MRA NECK W WO CONT; Future             I have discussed the diagnosis with the patient and the intended plan as seen in the above orders. The patient has received an after-visit summary and questions were answered concerning future plans. I have discussed medication side effects and warnings with the patient as well. I have reviewed the plan of care with the patient, accepted their input and they are in agreement with the treatment goals. Previous lab and imaging results were reviewed by me.        Santos Rogers MD  May 16, 2022

## 2022-05-25 ENCOUNTER — OFFICE VISIT (OUTPATIENT)
Dept: CARDIOLOGY CLINIC | Age: 82
End: 2022-05-25
Payer: MEDICARE

## 2022-05-25 VITALS
HEIGHT: 68 IN | BODY MASS INDEX: 24.71 KG/M2 | HEART RATE: 86 BPM | WEIGHT: 163 LBS | DIASTOLIC BLOOD PRESSURE: 82 MMHG | OXYGEN SATURATION: 99 % | SYSTOLIC BLOOD PRESSURE: 152 MMHG

## 2022-05-25 DIAGNOSIS — R07.9 CHEST PAIN, UNSPECIFIED TYPE: ICD-10-CM

## 2022-05-25 DIAGNOSIS — I25.10 CORONARY ARTERY DISEASE INVOLVING NATIVE CORONARY ARTERY OF NATIVE HEART WITHOUT ANGINA PECTORIS: Primary | ICD-10-CM

## 2022-05-25 DIAGNOSIS — I63.449 CEREBROVASCULAR ACCIDENT (CVA) DUE TO EMBOLISM OF CEREBELLAR ARTERY, UNSPECIFIED BLOOD VESSEL LATERALITY (HCC): ICD-10-CM

## 2022-05-25 DIAGNOSIS — R00.2 PALPITATIONS: ICD-10-CM

## 2022-05-25 PROCEDURE — G8754 DIAS BP LESS 90: HCPCS | Performed by: INTERNAL MEDICINE

## 2022-05-25 PROCEDURE — G8536 NO DOC ELDER MAL SCRN: HCPCS | Performed by: INTERNAL MEDICINE

## 2022-05-25 PROCEDURE — G8753 SYS BP > OR = 140: HCPCS | Performed by: INTERNAL MEDICINE

## 2022-05-25 PROCEDURE — G8427 DOCREV CUR MEDS BY ELIG CLIN: HCPCS | Performed by: INTERNAL MEDICINE

## 2022-05-25 PROCEDURE — G8510 SCR DEP NEG, NO PLAN REQD: HCPCS | Performed by: INTERNAL MEDICINE

## 2022-05-25 PROCEDURE — 99214 OFFICE O/P EST MOD 30 MIN: CPT | Performed by: INTERNAL MEDICINE

## 2022-05-25 PROCEDURE — G8420 CALC BMI NORM PARAMETERS: HCPCS | Performed by: INTERNAL MEDICINE

## 2022-05-25 PROCEDURE — 1101F PT FALLS ASSESS-DOCD LE1/YR: CPT | Performed by: INTERNAL MEDICINE

## 2022-05-25 PROCEDURE — 1123F ACP DISCUSS/DSCN MKR DOCD: CPT | Performed by: INTERNAL MEDICINE

## 2022-05-25 NOTE — PROGRESS NOTES
HISTORY OF PRESENT ILLNESS  Jolene Koyanagi is a [de-identified] y.o. male. ASSESSMENT and PLAN    Mr. Jolene Koyanagi has history of CAD.  Around 2005, he had 4 stents placed at [facility].  In December 2016, he presented to [facility] with increased exertional chest pains for several months. [facility] was diagnosed with ACS. [facility] subsequently underwent angioplasty and stent of proximal RCA 90% ISR residual 0% using DERIAN.  He also had focal LAD 90% ISR stented to 0%. He has MIRNA.  He does have a CPAP machine.  Between 2019 and 2021, he did not wear CPAP machine. He had been on [address], this was switched to Plavix as he has had frequent epistaxis. [facility] has also noticed some blood on the toilet paper. Leo Nguyen will defer further evaluation of rectal blood to the PCP.   He self discontinued Plavix February 2021. He presented to the emergency room with chest heaviness in November 2020. [facility] was evaluated and discharged home. His echocardiogram done July 2021 revealed EF 55-60%. His nuclear scan revealed probably normal finding with EF 64%. He had small fixed inferior defect which was felt to be diaphragmatic attenuation. Continued medical therapy was recommended. · CAD:    Symptomatically stable. · BP:    Mildly elevated at 152/82. I would continue his current medication regimen for the time being. · Rhythm:    Regular at 86 bpm.  · CHF:    There is no evidence of decompensated CHF noted. · Weight:     His weight today is 163 pounds. His baseline weight is 169 pounds. His current weight is without significant change. · Cholesterol:   Target LDL <70. Lipitor 10. · Anti-platelet:   Remains on ASA; because of his recent episodes of possible TIA, I have reinstituted Plavix 75 mg daily. For his possible TIA, I have requested an echocardiogram to see if there is any embolic source or ASD/PFO. 2-day Holter has also been requested for checking his rhythm.   He has MRA scheduled for 5/27/2022. Therefore, carotid duplex was not ordered. I will see him back in 6 months.  Thank you. Encounter Diagnoses   Name Primary?  Coronary artery disease involving native coronary artery of native heart without angina pectoris Yes    Chest pain, unspecified type     Palpitations      the following changes in treatment are made: See above  lab results and schedule of future lab studies reviewed with patient  reviewed diet, exercise and weight control  cardiovascular risk and specific lipid/LDL goals reviewed  use of aspirin to prevent MI and TIA's discussed      HPI   Today, Mr. Natali Cordova has no complaints of chest pains, or increased dyspnea on exertion. However, back in the beginning of May, he had a short brief episode of dysphagia. It resolved on its own. He states that he has had multiple episodes of what he calls mini strokes for the last several months. He is currently seeing neurology. He denies any changes in his activity levels. He denies any changes in his breathing status. He denies orthopnea or PND. He denies any palpitations or dizziness. Review of Systems   Respiratory: Negative for shortness of breath. Cardiovascular: Negative for chest pain, palpitations, orthopnea, claudication, leg swelling and PND. Neurological: Positive for speech change. All other systems reviewed and are negative. Physical Exam  Vitals and nursing note reviewed. Constitutional:       Appearance: Normal appearance. HENT:      Head: Normocephalic. Eyes:      Conjunctiva/sclera: Conjunctivae normal.   Cardiovascular:      Rate and Rhythm: Normal rate and regular rhythm. Pulmonary:      Breath sounds: Normal breath sounds. Abdominal:      Palpations: Abdomen is soft. Musculoskeletal:         General: No swelling. Skin:     General: Skin is warm and dry. Neurological:      General: No focal deficit present. Mental Status: He is alert and oriented to person, place, and time. Psychiatric:         Mood and Affect: Mood normal.         Behavior: Behavior normal.         PCP: Cesar Hernandez MD    Past Medical History:   Diagnosis Date    CAD (coronary artery disease)     Diabetes ([address]     Essential hypertension     Hyperlipidemia        Past Surgical History:   Procedure Laterality Date    CARDIAC CATHETERIZATION  12/28/2016         CORONARY ARTERY ANGIOGRAM  12/28/2016         CORONARY STENT EA ADDL VESSEL  12/28/2016         CORONARY STENT SINGLE W/PTCA  12/28/2016         HX CORONARY STENT PLACEMENT      HX HEART CATHETERIZATION      LV ANGIOGRAPHY  12/28/2016            Current Outpatient Medications   Medication Sig Dispense Refill    atorvastatin (LIPITOR) 10 mg tablet Take 1 tablet by mouth once daily 90 Tablet 3    red yeast rice extract 600 mg cap Take 600 mg by mouth daily.  nitroglycerin (NITROSTAT) 0.4 mg SL tablet 1 Tab by SubLINGual route every five (5) minutes as needed for Chest Pain. 25 Tab 3    aspirin 81 mg chewable tablet Take 81 mg by mouth daily. The patient has a family history of    Social History     Tobacco Use    Smoking status: Never Smoker    Smokeless tobacco: Never Used   Vaping Use    Vaping Use: Never used   Substance Use Topics    Alcohol use: No    Drug use: No       Lab Results   Component Value Date/Time    Cholesterol, total 120 12/29/2021 03:30 AM    HDL Cholesterol 28 (L) 12/29/2021 03:30 AM    LDL, calculated 48.6 12/29/2021 03:30 AM    Triglyceride 217 (H) 12/29/2021 03:30 AM    CHOL/HDL Ratio 4.3 12/29/2021 03:30 AM        BP Readings from Last 3 Encounters:   05/25/22 (!) 152/82   05/10/22 (!) 147/83   02/10/22 (!) 157/84        Pulse Readings from Last 3 Encounters:   05/25/22 86   05/10/22 91   02/10/22 83       Wt Readings from Last 3 Encounters:   05/25/22 73.9 kg (163 lb)   05/10/22 74.2 kg (163 lb 9.6 oz)   02/10/22 72.3 kg (159 lb 4.8 oz)         EKG: Not done today.

## 2022-05-25 NOTE — PROGRESS NOTES
Bonita Tabares presents today for   Chief Complaint   Patient presents with    Follow-up     6 month       Bonita Tabares preferred language for health care discussion is english/other. Is someone accompanying this pt? no    Is the patient using any DME equipment during [address]? no    Depression Screening:  3 most recent PHQ Screens 5/25/2022   Little interest or pleasure in doing things Not at all   Feeling down, depressed, irritable, or hopeless Not at all   Total Score PHQ 2 0       Learning Assessment:  Learning Assessment 5/25/2022   PRIMARY LEARNER Patient   PRIMARY LANGUAGE ENGLISH   LEARNER PREFERENCE PRIMARY DEMONSTRATION   ANSWERED BY patient   RELATIONSHIP SELF       Abuse Screening:  Abuse Screening Questionnaire 5/25/2022   Do you ever feel afraid of your partner? N   Are you in a relationship with someone who physically or mentally threatens you? N   Is it safe for you to go home? Y       Fall Risk  Fall Risk Assessment, last 12 mths 5/25/2022   Able to walk? Yes   Fall in past 12 months? 0   Do you feel unsteady? 0   Are you worried about falling 0   Number of falls in past 12 months -   Fall with injury? -           Pt currently taking Anticoagulant therapy? no    Pt currently taking Antiplatelet therapy ? Aspirin 81 mg daily      Coordination of Care:  1. Have you been to the ER, urgent care clinic since your last visit? Hospitalized since your last visit? no    2. Have you seen or consulted any other health care providers outside of the [address] [place] [address] since your last visit? Include any pap smears or colon screening.  no

## 2022-05-27 ENCOUNTER — HOSPITAL ENCOUNTER (OUTPATIENT)
Dept: MRI IMAGING | Age: 82
Discharge: HOME | End: 2022-05-27
Attending: STUDENT IN AN ORGANIZED HEALTH CARE EDUCATION/TRAINING PROGRAM
Payer: MEDICARE

## 2022-05-27 DIAGNOSIS — I63.9 CEREBELLAR INFARCTION (HCC): ICD-10-CM

## 2022-05-27 PROCEDURE — 74011250636 HC RX REV CODE- 250/636: Performed by: STUDENT IN AN ORGANIZED HEALTH CARE EDUCATION/TRAINING PROGRAM

## 2022-05-27 PROCEDURE — 70549 MR ANGIOGRAPH NECK W/O&W/DYE: CPT

## 2022-05-27 PROCEDURE — A9576 INJ PROHANCE MULTIPACK: HCPCS | Performed by: STUDENT IN AN ORGANIZED HEALTH CARE EDUCATION/TRAINING PROGRAM

## 2022-05-27 PROCEDURE — 70544 MR ANGIOGRAPHY HEAD W/O DYE: CPT

## 2022-05-27 RX ORDER — SODIUM CHLORIDE 9 MG/ML
15 INJECTION INTRAMUSCULAR; INTRAVENOUS; SUBCUTANEOUS
Status: DISPENSED | OUTPATIENT
Start: 2022-05-27 | End: 2022-05-27

## 2022-05-27 RX ADMIN — GADOTERIDOL 15 ML: 279.3 INJECTION, SOLUTION INTRAVENOUS at 09:12

## 2022-06-03 ENCOUNTER — TRANSCRIBE ORDER (OUTPATIENT)
Dept: SCHEDULING | Age: 82
End: 2022-06-03

## 2022-06-03 DIAGNOSIS — G95.9 DISEASE OF SPINAL CORD (HCC): Primary | ICD-10-CM

## 2022-06-24 ENCOUNTER — OFFICE VISIT (OUTPATIENT)
Dept: FAMILY MEDICINE CLINIC | Age: 82
End: 2022-06-24
Payer: MEDICARE

## 2022-06-24 VITALS
WEIGHT: 161.2 LBS | RESPIRATION RATE: 20 BRPM | OXYGEN SATURATION: 98 % | TEMPERATURE: 96.6 F | BODY MASS INDEX: 24.43 KG/M2 | DIASTOLIC BLOOD PRESSURE: 67 MMHG | SYSTOLIC BLOOD PRESSURE: 141 MMHG | HEIGHT: 68 IN | HEART RATE: 78 BPM

## 2022-06-24 DIAGNOSIS — I65.02 STENOSIS OF LEFT VERTEBRAL ARTERY: ICD-10-CM

## 2022-06-24 DIAGNOSIS — I63.9 CEREBELLAR INFARCTION (HCC): ICD-10-CM

## 2022-06-24 DIAGNOSIS — I25.10 CORONARY ARTERY DISEASE INVOLVING NATIVE CORONARY ARTERY OF NATIVE HEART WITHOUT ANGINA PECTORIS: Primary | ICD-10-CM

## 2022-06-24 PROCEDURE — G8536 NO DOC ELDER MAL SCRN: HCPCS | Performed by: STUDENT IN AN ORGANIZED HEALTH CARE EDUCATION/TRAINING PROGRAM

## 2022-06-24 PROCEDURE — G8510 SCR DEP NEG, NO PLAN REQD: HCPCS | Performed by: STUDENT IN AN ORGANIZED HEALTH CARE EDUCATION/TRAINING PROGRAM

## 2022-06-24 PROCEDURE — G8427 DOCREV CUR MEDS BY ELIG CLIN: HCPCS | Performed by: STUDENT IN AN ORGANIZED HEALTH CARE EDUCATION/TRAINING PROGRAM

## 2022-06-24 PROCEDURE — G8753 SYS BP > OR = 140: HCPCS | Performed by: STUDENT IN AN ORGANIZED HEALTH CARE EDUCATION/TRAINING PROGRAM

## 2022-06-24 PROCEDURE — 1101F PT FALLS ASSESS-DOCD LE1/YR: CPT | Performed by: STUDENT IN AN ORGANIZED HEALTH CARE EDUCATION/TRAINING PROGRAM

## 2022-06-24 PROCEDURE — 1123F ACP DISCUSS/DSCN MKR DOCD: CPT | Performed by: STUDENT IN AN ORGANIZED HEALTH CARE EDUCATION/TRAINING PROGRAM

## 2022-06-24 PROCEDURE — G8754 DIAS BP LESS 90: HCPCS | Performed by: STUDENT IN AN ORGANIZED HEALTH CARE EDUCATION/TRAINING PROGRAM

## 2022-06-24 PROCEDURE — G8420 CALC BMI NORM PARAMETERS: HCPCS | Performed by: STUDENT IN AN ORGANIZED HEALTH CARE EDUCATION/TRAINING PROGRAM

## 2022-06-24 PROCEDURE — 99214 OFFICE O/P EST MOD 30 MIN: CPT | Performed by: STUDENT IN AN ORGANIZED HEALTH CARE EDUCATION/TRAINING PROGRAM

## 2022-06-24 NOTE — PROGRESS NOTES
Jolene Koyanagi presents today for   Chief Complaint   Patient presents with    Follow-up     PT reports no concerns at this time. ( pt reports having MRA x 1 month ago needing results)        Is someone accompanying this pt? NO    Is the patient using any DME equipment during OV? NO    Depression Screening:  3 most recent PHQ Screens 6/24/2022   Little interest or pleasure in doing things Not at all   Feeling down, depressed, irritable, or hopeless Not at all   Total Score PHQ 2 0       Learning Assessment:  Learning Assessment 5/25/2022   PRIMARY LEARNER Patient   PRIMARY LANGUAGE ENGLISH   LEARNER PREFERENCE PRIMARY DEMONSTRATION   ANSWERED BY patient   RELATIONSHIP SELF       Fall Risk  Fall Risk Assessment, last 12 mths 6/24/2022   Able to walk? Yes   Fall in past 12 months? 1   Do you feel unsteady? 0   Are you worried about falling 0   Is TUG test greater than 12 seconds? 0   Is the gait abnormal? 0   Number of falls in past 12 months 2   Fall with injury? 0       Health Maintenance reviewed and discussed and ordered per Provider. Health Maintenance Due   Topic Date Due    Pneumococcal 65+ years (1 - PCV) Never done    Foot Exam Q1  Never done    Eye Exam Retinal or Dilated  Never done    DTaP/Tdap/Td series (1 - Tdap) Never done    Shingrix Vaccine Age 50> (1 of 2) Never done    COVID-19 Vaccine (3 - Booster for Moderna series) 11/18/2021    Medicare Yearly Exam  06/22/2022    MICROALBUMIN Q1  06/25/2022   . Coordination of Care:    1. \"Have you been to the ER, urgent care clinic since your last visit? Hospitalized since your last visit? \" No    2. \"Have you seen or consulted any other health care providers outside of the [address] [place] [address] since your last visit? \" No     3. For patients aged 39-70: Has the patient had a colonoscopy? Yes - no Care Gap present     If the patient is female:    4. For patients aged 41-77: Has the patient had a mammogram within the past 2 years? No    5. For patients aged 21-65: Has the patient had a pap smear?  No

## 2022-06-24 NOTE — PROGRESS NOTES
Subjective: Diamond Olivo is a [de-identified] y.o. male who was seen for Follow-up (PT reports no concerns at this time. ( pt reports having MRA x 1 month ago needing results) )    Patient had MRA neck which showed 50% blockage of vertebral artery and mild left carotid artery stenosis. Denies any recent falls. Gait has improved. Currently on aspirin and Lipitor. He had a Holter monitor and is awaiting his results. Home Medications    Medication Sig Start Date End Date Taking? Authorizing Provider   atorvastatin (LIPITOR) 10 mg tablet Take 1 tablet by mouth once daily 6/21/21  Yes Oscar Boateng MD   red yeast rice extract 600 mg cap Take 600 mg by mouth daily. Yes Provider, Historical   aspirin 81 mg chewable tablet Take 81 mg by mouth daily. Yes Provider, Historical   nitroglycerin (NITROSTAT) 0.4 mg SL tablet 1 Tab by SubLINGual route every five (5) minutes as needed for Chest Pain. 1/30/17   Arnulfo Sesay NP      No Known Allergies  Social History     Tobacco Use    Smoking status: Never Smoker    Smokeless tobacco: Never Used   Vaping Use    Vaping Use: Never used   Substance Use Topics    Alcohol use: No    Drug use: No        Review of Systems   All other systems reviewed and are negative. Objective:     Visit Vitals  BP (!) 141/67 (BP 1 Location: Right upper arm, BP Patient Position: Sitting, BP Cuff Size: Adult)   Pulse 78   Temp (!) 96.6 °F (35.9 °C) (Temporal)   Resp 20   Ht 5' 8\" (1.727 m)   Wt 161 lb 3.2 oz (73.1 kg)   SpO2 98%   BMI 24.51 kg/m²        General: alert, oriented, not in distress  Chest/Lungs: clear breath sounds, no wheezing or crackles  Heart: normal rate, regular rhythm, no murmur  Abdomen: soft, non-distended, non-tender, normal bowel sounds, no organomegaly, no masses  Extremities: no focal deformities, no edema  Skin: no active skin lesions      Assessment & Plan:     1.  Coronary artery disease involving native coronary artery of native heart without angina pectoris  Continue aspirin, statin    2. Stenosis of left vertebral artery  Repeat MRA head and neck in 1 year. Continue aspirin, Lipitor. Check lipid panel  - LIPID PANEL  - METABOLIC PANEL, COMPREHENSIVE    3. Cerebellar infarction (HCC)  Continue aspirin, Lipitor. Likely vascular etiology. Currently awaiting Holter monitor results             I have discussed the diagnosis with the patient and the intended plan as seen in the above orders. The patient has received an after-visit summary and questions were answered concerning future plans. I have discussed medication side effects and warnings with the patient as well. I have reviewed the plan of care with the patient, accepted their input and they are in agreement with the treatment goals. Previous lab and imaging results were reviewed by me.        Tricia Forman MD  June 24, 2022

## 2022-07-12 ENCOUNTER — TELEPHONE (OUTPATIENT)
Dept: CARDIOLOGY CLINIC | Age: 82
End: 2022-07-12

## 2022-07-12 NOTE — TELEPHONE ENCOUNTER
----- Message from [id number] Pedro Winkler MD sent at 7/12/2022  2:16 PM EDT -----  Please let the patient know that his echocardiogram is without significant change from his study of December 2021.  ----- Message -----  From: Marcia Cobian LPN  Sent: 8/37/2059   1:01 PM EDT  To: [id number] Pedro Winkler MD    Per your last note: For his possible TIA, I have requested an echocardiogram to see if there is any embolic source or ASD/PFO. 2-day Holter has also been requested for checking his rhythm. He has MRA scheduled for 5/27/2022. Therefore, carotid duplex was not ordered.

## 2023-01-31 DIAGNOSIS — G95.9 DISEASE OF SPINAL CORD (HCC): Primary | ICD-10-CM

## 2023-02-03 DIAGNOSIS — G95.9 DISEASE OF SPINAL CORD (HCC): Primary | ICD-10-CM

## 2023-03-15 ENCOUNTER — OFFICE VISIT (OUTPATIENT)
Age: 83
End: 2023-03-15
Payer: MEDICARE

## 2023-03-15 VITALS
DIASTOLIC BLOOD PRESSURE: 82 MMHG | HEIGHT: 68 IN | WEIGHT: 166 LBS | SYSTOLIC BLOOD PRESSURE: 148 MMHG | BODY MASS INDEX: 25.16 KG/M2 | OXYGEN SATURATION: 99 % | HEART RATE: 70 BPM

## 2023-03-15 DIAGNOSIS — I25.10 ATHEROSCLEROSIS OF NATIVE CORONARY ARTERY OF NATIVE HEART WITHOUT ANGINA PECTORIS: Primary | ICD-10-CM

## 2023-03-15 DIAGNOSIS — I63.449 CEREBRAL INFARCTION DUE TO EMBOLISM OF CEREBELLAR ARTERY, UNSPECIFIED BLOOD VESSEL LATERALITY (HCC): ICD-10-CM

## 2023-03-15 DIAGNOSIS — R07.9 CHEST PAIN, UNSPECIFIED TYPE: ICD-10-CM

## 2023-03-15 DIAGNOSIS — R00.2 PALPITATIONS: ICD-10-CM

## 2023-03-15 PROCEDURE — 99214 OFFICE O/P EST MOD 30 MIN: CPT | Performed by: INTERNAL MEDICINE

## 2023-03-15 PROCEDURE — 1123F ACP DISCUSS/DSCN MKR DOCD: CPT | Performed by: INTERNAL MEDICINE

## 2023-03-15 PROCEDURE — 93000 ELECTROCARDIOGRAM COMPLETE: CPT | Performed by: INTERNAL MEDICINE

## 2023-03-15 PROCEDURE — 3079F DIAST BP 80-89 MM HG: CPT | Performed by: INTERNAL MEDICINE

## 2023-03-15 PROCEDURE — 3077F SYST BP >= 140 MM HG: CPT | Performed by: INTERNAL MEDICINE

## 2023-03-15 RX ORDER — METOPROLOL SUCCINATE 25 MG/1
25 TABLET, EXTENDED RELEASE ORAL DAILY
Qty: 30 TABLET | Refills: 6 | Status: SHIPPED | OUTPATIENT
Start: 2023-03-15

## 2023-03-15 RX ORDER — ROSUVASTATIN CALCIUM 10 MG/1
10 TABLET, COATED ORAL NIGHTLY
Qty: 30 TABLET | Refills: 6 | Status: SHIPPED | OUTPATIENT
Start: 2023-03-15

## 2023-03-15 RX ORDER — CLOPIDOGREL BISULFATE 75 MG/1
75 TABLET ORAL DAILY
Qty: 30 TABLET | Refills: 5 | Status: SHIPPED | OUTPATIENT
Start: 2023-03-15

## 2023-03-15 ASSESSMENT — PATIENT HEALTH QUESTIONNAIRE - PHQ9
SUM OF ALL RESPONSES TO PHQ9 QUESTIONS 1 & 2: 0
2. FEELING DOWN, DEPRESSED OR HOPELESS: 0
SUM OF ALL RESPONSES TO PHQ QUESTIONS 1-9: 0
1. LITTLE INTEREST OR PLEASURE IN DOING THINGS: 0

## 2023-03-15 ASSESSMENT — ENCOUNTER SYMPTOMS: SHORTNESS OF BREATH: 1

## 2023-03-15 NOTE — PROGRESS NOTES
HISTORY OF PRESENT ILLNESS  Leeann Gross  81 y.o. male     Chief Complaint   Patient presents with    Follow-up     Overdue 6 month     Shortness of Breath     SOB with exertion     Dizziness     Dizzy spells constant        ASSESSMENT and PLAN    The primary encounter diagnosis was Atherosclerosis of native coronary artery of native heart without angina pectoris. 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 Mr. Leeann Gross has history of CAD. Around 2005, he had 4 stents placed at [facility].  In December 2016, he presented to [facility] with increased exertional chest pains for several months. He was diagnosed with ACS. He subsequently underwent angioplasty and stent of proximal RCA 90% ISR residual 0% using LAYNE. He also had focal LAD 90% ISR stented to 0%. He has SÁNCHEZ. He does have a CPAP machine. Between 2019 and 2021, he did not wear CPAP machine. He had been on Brilinta. However, this was switched to Plavix as he has had frequent epistaxis. He has also noticed some blood on the toilet paper. I will defer further evaluation of rectal blood to the PCP. He self discontinued Plavix February 2021. He presented to the emergency room with chest heaviness in November 2020. He was evaluated and discharged home. His echocardiogram done July 2021 revealed EF 55-60%. His nuclear scan revealed probably normal finding with EF 64%. He had small fixed inferior defect which was felt to be diaphragmatic attenuation. Continued medical therapy was recommended. CAD:    Symptomatically stable. He continues to have his baseline OTT. BP:    Upper normal at 148/82. Rhythm:    Sinus rhythm at 70 bpm.  He has chronic LBBB. CHF:    There is no evidence of decompensated CHF noted.   Weight:     His weight today is 166 pounds. This is near his baseline weight. Cholesterol:   Target LDL <70. Patient stopped taking Crestor on his own. It is restarted at 10 mg daily. Tobacco:   He denies active tobacco use. Anti-platelet:   Remains on ASA; he is restarted on Plavix today. I will see him back in 6 months. Thank you. Orders Placed This Encounter   Procedures    EKG 12 Lead     Order Specific Question:   Reason for Exam?     Answer: Other        Shortness of Breath    Dizziness    Today, Mr. Amira Moreno has no complaints of chest pains or increased OTT from his baseline. His biggest complaint appears to be his vision. He has difficulty focusing. He is currently seeing an ophthalmologist for this. He denies any changes in his activities. He denies any changes in his exercise capacity. He has self discontinued several of his medications including Plavix, Crestor. Review of Systems  14 point review of system is negative unless mentioned in HPI    Physical Exam  Vitals and nursing note reviewed. Constitutional:       Appearance: Normal appearance. HENT:      Head: Normocephalic. Eyes:      Conjunctiva/sclera: Conjunctivae normal.   Neck:      Vascular: No carotid bruit. Cardiovascular:      Rate and Rhythm: Normal rate and regular rhythm. Pulmonary:      Breath sounds: Normal breath sounds. Abdominal:      Palpations: Abdomen is soft. Musculoskeletal:         General: No swelling. Cervical back: No rigidity. Skin:     General: Skin is warm and dry. Neurological:      General: No focal deficit present. Mental Status: He is alert and oriented to person, place, and time. Psychiatric:         Mood and Affect: Mood normal.         Behavior: Behavior normal.          PCP: Luc Child MD    Past Medical History:   Diagnosis Date    CAD (coronary artery disease)     Cluster migraine headaches     Covid vaccine complete 4/13/2021.  Booster 12/15/2021     Di[address] [address]     Essential hypertension Hyperlipidemia        Past Surgical History:   Procedure Laterality Date    CARDIAC CATH PROCEDURE  12/28/2016         CARDIAC CATHETERIZATION      CORONARY ANGIOPLASTY WITH STENT PLACEMENT      CORONARY ARTERY ANGIOGRAM  12/28/2016         CORONARY STENT EA ADDL VESSEL  12/28/2016         CORONARY STENT SINGLE W/PTCA  12/28/2016         LV ANGIOGRAPHY  12/28/2016         SOFT TISSUE BIOPSY Bilateral 03/06/2019    calcified tissue removal       Current Outpatient Medications   Medication Sig Dispense Refill    metoprolol succinate (TOPROL XL) 25 MG extended release tablet Take 1 tablet by mouth daily 30 tablet 6    clopidogrel (PLAVIX) 75 MG tablet Take 1 tablet by mouth daily 30 tablet 5    rosuvastatin (CRESTOR) 10 MG tablet Take 1 tablet by mouth nightly 30 tablet 6    aspirin 81 MG chewable tablet Take 81 mg by mouth daily      nitroGLYCERIN (NITROSTAT) 0.4 MG SL tablet Place 0.4 mg under the tongue      Red Yeast Rice Extract 600 MG CAPS Take 600 mg by mouth daily       No current facility-administered medications for this visit. The patient has a family history of    Social History     Tobacco Use    Smoking status: Never    Smokeless tobacco: Never    Tobacco comments:     12/15/2021   Substance Use Topics    Alcohol use: No     Alcohol/week: 1.0 - 2.0 standard drink     Types: 1 - 2 Glasses of wine per week    Drug use: No       Lab Results   Component Value Date/Time    CHOL 120 12/29/2021 03:30 AM    HDL 28 12/29/2021 03:30 AM        BP Readings from Last 3 Encounters:   03/15/23 (!) 148/82   07/12/22 (!) 141/67   06/24/22 (!) 141/67        Pulse Readings from Last 3 Encounters:   03/15/23 70   06/24/22 78   05/25/22 86       Wt Readings from Last 3 Encounters:   03/15/23 166 lb (75.3 kg)   07/12/22 161 lb (73 kg)   06/24/22 161 lb 3.2 oz (73.1 kg)         EKG: normal sinus rhythm, LBBB, unchanged from previous tracings.      Feliberto Lam MD   3/15/2023

## 2023-03-15 NOTE — PROGRESS NOTES
Antoniomaikel Sindy presents today for   Chief Complaint   Patient presents with    Follow-up     Overdue 6 month     Shortness of Breath     SOB with exertion     Dizziness     Dizzy spells constant        John Callahan preferred language for health care discussion is english/other. Is someone accompanying this pt? no    Is the patient using any DME equipment during OV? no    Depression Screening:  Depression: Not at risk    PHQ-2 Score: 0        Learning Assessment:  Who is the primary learner? Patient    What is the preferred language for health care of the primary learner? ENGLISH    How does the primary learner prefer to learn new concepts? DEMONSTRATION    Answered By patient    Relationship to Learner SELF           Pt currently taking Anticoagulant therapy? no    Pt currently taking Antiplatelet therapy ? ASA 81 mg 1x daily       Coordination of Care:  1. Have you been to the ER, urgent care clinic since your last visit? Hospitalized since your last visit? no    2. Have you seen or consulted any other health care providers outside of the [address] [place] [address] since your last visit? Include any pap smears or colon screening.  no

## 2023-09-20 ENCOUNTER — OFFICE VISIT (OUTPATIENT)
Age: 83
End: 2023-09-20
Payer: MEDICARE

## 2023-09-20 VITALS
HEIGHT: 68 IN | SYSTOLIC BLOOD PRESSURE: 128 MMHG | OXYGEN SATURATION: 96 % | BODY MASS INDEX: 24.86 KG/M2 | HEART RATE: 71 BPM | DIASTOLIC BLOOD PRESSURE: 80 MMHG | WEIGHT: 164 LBS

## 2023-09-20 DIAGNOSIS — I63.449 CEREBRAL INFARCTION DUE TO EMBOLISM OF CEREBELLAR ARTERY, UNSPECIFIED BLOOD VESSEL LATERALITY (HCC): ICD-10-CM

## 2023-09-20 DIAGNOSIS — R00.2 PALPITATIONS: ICD-10-CM

## 2023-09-20 DIAGNOSIS — I25.10 ATHEROSCLEROSIS OF NATIVE CORONARY ARTERY OF NATIVE HEART WITHOUT ANGINA PECTORIS: Primary | ICD-10-CM

## 2023-09-20 DIAGNOSIS — R07.9 CHEST PAIN, UNSPECIFIED TYPE: ICD-10-CM

## 2023-09-20 PROBLEM — E78.00 PURE HYPERCHOLESTEROLEMIA: Status: ACTIVE | Noted: 2019-04-22

## 2023-09-20 PROBLEM — I95.1 ORTHOSTATIC HYPOTENSION: Status: ACTIVE | Noted: 2019-04-24

## 2023-09-20 PROBLEM — R55 SYNCOPE AND COLLAPSE: Status: ACTIVE | Noted: 2019-04-22

## 2023-09-20 PROBLEM — E78.5 DYSLIPIDEMIA: Status: ACTIVE | Noted: 2017-04-18

## 2023-09-20 PROBLEM — J11.1 INFLUENZA: Status: ACTIVE | Noted: 2021-12-28

## 2023-09-20 PROBLEM — U07.1 PNEUMONIA DUE TO COVID-19 VIRUS: Status: ACTIVE | Noted: 2021-12-28

## 2023-09-20 PROBLEM — E11.9 TYPE 2 DIABETES MELLITUS WITHOUT COMPLICATION, WITHOUT LONG-TERM CURRENT USE OF INSULIN (HCC): Status: ACTIVE | Noted: 2019-04-22

## 2023-09-20 PROBLEM — J12.82 PNEUMONIA DUE TO COVID-19 VIRUS: Status: ACTIVE | Noted: 2021-12-28

## 2023-09-20 PROBLEM — I10 ESSENTIAL HYPERTENSION: Status: ACTIVE | Noted: 2017-04-18

## 2023-09-20 PROCEDURE — 93000 ELECTROCARDIOGRAM COMPLETE: CPT | Performed by: INTERNAL MEDICINE

## 2023-09-20 PROCEDURE — 3079F DIAST BP 80-89 MM HG: CPT | Performed by: INTERNAL MEDICINE

## 2023-09-20 PROCEDURE — 99214 OFFICE O/P EST MOD 30 MIN: CPT | Performed by: INTERNAL MEDICINE

## 2023-09-20 PROCEDURE — 1123F ACP DISCUSS/DSCN MKR DOCD: CPT | Performed by: INTERNAL MEDICINE

## 2023-09-20 PROCEDURE — 3074F SYST BP LT 130 MM HG: CPT | Performed by: INTERNAL MEDICINE

## 2023-09-20 RX ORDER — CLOPIDOGREL BISULFATE 75 MG/1
75 TABLET ORAL DAILY
Qty: 90 TABLET | Refills: 3 | Status: SHIPPED | OUTPATIENT
Start: 2023-09-20

## 2023-09-20 RX ORDER — METOPROLOL SUCCINATE 25 MG/1
25 TABLET, EXTENDED RELEASE ORAL DAILY
Qty: 90 TABLET | Refills: 3 | Status: SHIPPED | OUTPATIENT
Start: 2023-09-20

## 2023-09-20 RX ORDER — ROSUVASTATIN CALCIUM 10 MG/1
10 TABLET, COATED ORAL NIGHTLY
Qty: 90 TABLET | Refills: 3 | Status: SHIPPED | OUTPATIENT
Start: 2023-09-20

## 2023-09-20 ASSESSMENT — ANXIETY QUESTIONNAIRES
5. BEING SO RESTLESS THAT IT IS HARD TO SIT STILL: 0
6. BECOMING EASILY ANNOYED OR IRRITABLE: 0
4. TROUBLE RELAXING: 0
GAD7 TOTAL SCORE: 0
7. FEELING AFRAID AS IF SOMETHING AWFUL MIGHT HAPPEN: 0
3. WORRYING TOO MUCH ABOUT DIFFERENT THINGS: 0
2. NOT BEING ABLE TO STOP OR CONTROL WORRYING: 0
1. FEELING NERVOUS, ANXIOUS, OR ON EDGE: 0

## 2023-09-20 ASSESSMENT — PATIENT HEALTH QUESTIONNAIRE - PHQ9
SUM OF ALL RESPONSES TO PHQ QUESTIONS 1-9: 0
1. LITTLE INTEREST OR PLEASURE IN DOING THINGS: 0
SUM OF ALL RESPONSES TO PHQ9 QUESTIONS 1 & 2: 0
SUM OF ALL RESPONSES TO PHQ QUESTIONS 1-9: 0
2. FEELING DOWN, DEPRESSED OR HOPELESS: 0

## 2023-09-20 NOTE — PROGRESS NOTES
Irma Mcintosh presents today for   Chief Complaint   Patient presents with    Follow-up     6 month       Irma Mcintosh preferred language for health care discussion is english/other. Is someone accompanying this pt? no    Is the patient using any DME equipment during OV? no    Depression Screening:  Depression: Not at risk (9/20/2023)    PHQ-2     PHQ-2 Score: 0        Learning Assessment:  Who is the primary learner? Patient    What is the preferred language for health care of the primary learner? ENGLISH    How does the primary learner prefer to learn new concepts? DEMONSTRATION    Answered By patient    Relationship to Learner SELF           Pt currently taking Anticoagulant therapy? no    Pt currently taking Antiplatelet therapy ? Aspirin 81 mg daily and plavix 75 mg daily      Coordination of Care:  1. Have you been to the ER, urgent care clinic since your last visit? Hospitalized since your last visit? no    2. Have you seen or consulted any other health care providers outside of the [address] [place] [address] since your last visit? Include any pap smears or colon screening.  no
LBBB.  CHF:    There is no evidence of decompensated CHF noted. Weight:     His weight today is 164 pounds. This is at baseline. Cholesterol:   Target LDL <70. Crestor 10 mg daily. Tobacco:   Denies active tobacco use. Anti-platelet:   Remains on ASA, Plavix. With his episodes of dizzy spells, I have requested repeat echocardiogram as well as 1 week event monitor. I suspect that this is not cardiac or rhythm mediated. Nonetheless, for completeness, will check. He had been off of beta-blocker in the past.  If his orthostatic hypotension is being worse, stopping beta-blocker may abdominal improvement in his symptoms. I will see him back in 6 months. Thank you. Orders Placed This Encounter   Procedures    EKG 12 Lead     Order Specific Question:   Reason for Exam?     Answer: Other        HPI  Today, Mr. Darcie Bagley has no complaints of chest pains, increased shortness of breath. For over 5 years, he has been struggling with worsening dizziness. He has no palpitation association. Oftentimes it is postural.  He has never had dizziness when he is laying down. He has a significant dizziness when he stands up quickly consistent with orthostasis. He has had some dizzy spells when he is sitting. He denies any orthopnea or PND. He denies palpitation episodes. Review of Systems  14 point review of system is negative unless mentioned in HPI    Physical Exam  Vitals and nursing note reviewed. Constitutional:       Appearance: Normal appearance. HENT:      Head: Normocephalic. Eyes:      Conjunctiva/sclera: Conjunctivae normal.   Neck:      Vascular: No carotid bruit. Cardiovascular:      Rate and Rhythm: Normal rate and regular rhythm. Pulmonary:      Breath sounds: Normal breath sounds. Abdominal:      Palpations: Abdomen is soft. Musculoskeletal:         General: No swelling. Cervical back: No rigidity. Skin:     General: Skin is warm and dry.    Neurological:      General: No

## 2023-10-26 ENCOUNTER — TELEPHONE (OUTPATIENT)
Age: 83
End: 2023-10-26

## 2023-10-26 NOTE — TELEPHONE ENCOUNTER
----- Message from [address] [facility] [facility] [facility] MD sent at 10/11/2023  8:31 AM EDT -----  Please let the patient know that the event monitor shows regular rhythm without significant abnormalities  ----- Message -----  From: Mariya Farnsworth RN  Sent: 10/10/2023   6:33 AM EDT  To: [facility] [facility] [facility], MD    Per your last note : With his episodes of dizzy spells, I have requested repeat echocardiogram as well as 1 week event monitor. I suspect that this is not cardiac or rhythm mediated. Nonetheless, for completeness, will check.

## 2023-12-13 ENCOUNTER — TELEPHONE (OUTPATIENT)
Age: 83
End: 2023-12-13

## 2023-12-13 NOTE — TELEPHONE ENCOUNTER
----- Message from [address] [facility], MD sent at 11/21/2023  4:25 PM EST -----  Let the patient know the echocardiogram shows near normal LV function.  ----- Message -----  From: Sarbjit Waggoner RN  Sent: 11/16/2023   6:16 AM EST  To: [facility], MD    Per your last note : With his episodes of dizzy spells, I have requested repeat echocardiogram as well as 1 week event monitor. I suspect that this is not cardiac or rhythm mediated. Nonetheless, for completeness, will check.     He had been off of beta-blocker in the past.  If his orthostatic hypotension is being worse, stopping beta-blocker may abdominal improvement in his symptom

## 2024-03-20 ENCOUNTER — OFFICE VISIT (OUTPATIENT)
Age: 84
End: 2024-03-20
Payer: MEDICARE

## 2024-03-20 VITALS
WEIGHT: 167 LBS | HEART RATE: 67 BPM | OXYGEN SATURATION: 99 % | BODY MASS INDEX: 25.31 KG/M2 | SYSTOLIC BLOOD PRESSURE: 138 MMHG | HEIGHT: 68 IN | DIASTOLIC BLOOD PRESSURE: 88 MMHG

## 2024-03-20 DIAGNOSIS — R07.9 CHEST PAIN, UNSPECIFIED TYPE: ICD-10-CM

## 2024-03-20 DIAGNOSIS — R00.2 PALPITATIONS: ICD-10-CM

## 2024-03-20 DIAGNOSIS — I25.10 ATHEROSCLEROSIS OF NATIVE CORONARY ARTERY OF NATIVE HEART WITHOUT ANGINA PECTORIS: Primary | ICD-10-CM

## 2024-03-20 DIAGNOSIS — I63.449 CEREBRAL INFARCTION DUE TO EMBOLISM OF CEREBELLAR ARTERY, UNSPECIFIED BLOOD VESSEL LATERALITY (HCC): ICD-10-CM

## 2024-03-20 PROCEDURE — 1123F ACP DISCUSS/DSCN MKR DOCD: CPT | Performed by: INTERNAL MEDICINE

## 2024-03-20 PROCEDURE — 3079F DIAST BP 80-89 MM HG: CPT | Performed by: INTERNAL MEDICINE

## 2024-03-20 PROCEDURE — 93000 ELECTROCARDIOGRAM COMPLETE: CPT | Performed by: INTERNAL MEDICINE

## 2024-03-20 PROCEDURE — 3075F SYST BP GE 130 - 139MM HG: CPT | Performed by: INTERNAL MEDICINE

## 2024-03-20 PROCEDURE — 99214 OFFICE O/P EST MOD 30 MIN: CPT | Performed by: INTERNAL MEDICINE

## 2024-03-20 ASSESSMENT — PATIENT HEALTH QUESTIONNAIRE - PHQ9
SUM OF ALL RESPONSES TO PHQ QUESTIONS 1-9: 0
SUM OF ALL RESPONSES TO PHQ9 QUESTIONS 1 & 2: 0
SUM OF ALL RESPONSES TO PHQ QUESTIONS 1-9: 0
SUM OF ALL RESPONSES TO PHQ QUESTIONS 1-9: 0
2. FEELING DOWN, DEPRESSED OR HOPELESS: NOT AT ALL
1. LITTLE INTEREST OR PLEASURE IN DOING THINGS: NOT AT ALL
SUM OF ALL RESPONSES TO PHQ QUESTIONS 1-9: 0

## 2024-03-20 ASSESSMENT — ANXIETY QUESTIONNAIRES
7. FEELING AFRAID AS IF SOMETHING AWFUL MIGHT HAPPEN: NOT AT ALL
4. TROUBLE RELAXING: NOT AT ALL
3. WORRYING TOO MUCH ABOUT DIFFERENT THINGS: NOT AT ALL
1. FEELING NERVOUS, ANXIOUS, OR ON EDGE: NOT AT ALL
GAD7 TOTAL SCORE: 0
6. BECOMING EASILY ANNOYED OR IRRITABLE: NOT AT ALL
2. NOT BEING ABLE TO STOP OR CONTROL WORRYING: NOT AT ALL
5. BEING SO RESTLESS THAT IT IS HARD TO SIT STILL: NOT AT ALL

## 2024-03-20 NOTE — PROGRESS NOTES
Curtis Higuera presents today for   Chief Complaint   Patient presents with    Follow-up     6 months       Curtis Higuera preferred language for health care discussion is english/other.    Is someone accompanying this pt? no    Is the patient using any DME equipment during OV? no    Depression Screening:  Depression: Not at risk (3/20/2024)    PHQ-2     PHQ-2 Score: 0        Learning Assessment:  Who is the primary learner? Patient    What is the preferred language for health care of the primary learner? ENGLISH    How does the primary learner prefer to learn new concepts? DEMONSTRATION    Answered By patient    Relationship to Learner SELF           Pt currently taking Anticoagulant therapy? no    Pt currently taking Antiplatelet therapy ? Aspirin 81 mg daily and plavix 75 mg daily      Coordination of Care:  1. Have you been to the ER, urgent care clinic since your last visit? Hospitalized since your last visit? no    2. Have you seen or consulted any other health care providers outside of the [facility] since your last visit? Include any pap smears or colon screening. no

## 2024-03-20 NOTE — PROGRESS NOTES
Curtis Higuera presents today for   Chief Complaint   Patient presents with    Follow-up     6 months       Curtis Higuera preferred language for health care discussion is english/other.    Is someone accompanying this pt? no    Is the patient using any DME equipment during OV? no    Depression Screening:  Depression: Not at risk (3/20/2024)    PHQ-2     PHQ-2 Score: 0        Learning Assessment:  Who is the primary learner? Patient    What is the preferred language for health care of the primary learner? ENGLISH    How does the primary learner prefer to learn new concepts? DEMONSTRATION    Answered By patient    Relationship to Learner SELF           Pt currently taking Anticoagulant therapy? no    Pt currently taking Antiplatelet therapy ? Aspirin  plavix      Coordination of Care:  1. Have you been to the ER, urgent care clinic since your last visit? Hospitalized since your last visit? no    2. Have you seen or consulted any other health care providers outside of the [facility] since your last visit? Include any pap smears or colon screening. no

## 2024-03-27 ENCOUNTER — OFFICE VISIT (OUTPATIENT)
Facility: CLINIC | Age: 84
End: 2024-03-27
Payer: MEDICARE

## 2024-03-27 VITALS
OXYGEN SATURATION: 97 % | DIASTOLIC BLOOD PRESSURE: 85 MMHG | HEIGHT: 68 IN | BODY MASS INDEX: 25.7 KG/M2 | TEMPERATURE: 96.9 F | RESPIRATION RATE: 18 BRPM | SYSTOLIC BLOOD PRESSURE: 130 MMHG | HEART RATE: 73 BPM | WEIGHT: 169.6 LBS

## 2024-03-27 DIAGNOSIS — I25.10 CORONARY ARTERY DISEASE INVOLVING NATIVE HEART WITHOUT ANGINA PECTORIS, UNSPECIFIED VESSEL OR LESION TYPE: Primary | ICD-10-CM

## 2024-03-27 DIAGNOSIS — R73.03 PREDIABETES: ICD-10-CM

## 2024-03-27 DIAGNOSIS — L98.9 SKIN LESION OF BACK: ICD-10-CM

## 2024-03-27 PROCEDURE — 1123F ACP DISCUSS/DSCN MKR DOCD: CPT | Performed by: STUDENT IN AN ORGANIZED HEALTH CARE EDUCATION/TRAINING PROGRAM

## 2024-03-27 PROCEDURE — 3079F DIAST BP 80-89 MM HG: CPT | Performed by: STUDENT IN AN ORGANIZED HEALTH CARE EDUCATION/TRAINING PROGRAM

## 2024-03-27 PROCEDURE — 99214 OFFICE O/P EST MOD 30 MIN: CPT | Performed by: STUDENT IN AN ORGANIZED HEALTH CARE EDUCATION/TRAINING PROGRAM

## 2024-03-27 PROCEDURE — 3075F SYST BP GE 130 - 139MM HG: CPT | Performed by: STUDENT IN AN ORGANIZED HEALTH CARE EDUCATION/TRAINING PROGRAM

## 2024-03-27 SDOH — ECONOMIC STABILITY: FOOD INSECURITY: WITHIN THE PAST 12 MONTHS, THE FOOD YOU BOUGHT JUST DIDN'T LAST AND YOU DIDN'T HAVE MONEY TO GET MORE.: OFTEN TRUE

## 2024-03-27 SDOH — ECONOMIC STABILITY: INCOME INSECURITY: HOW HARD IS IT FOR YOU TO PAY FOR THE VERY BASICS LIKE FOOD, HOUSING, MEDICAL CARE, AND HEATING?: NOT VERY HARD

## 2024-03-27 SDOH — ECONOMIC STABILITY: HOUSING INSECURITY
IN THE LAST 12 MONTHS, WAS THERE A TIME WHEN YOU DID NOT HAVE A STEADY PLACE TO SLEEP OR SLEPT IN A SHELTER (INCLUDING NOW)?: PATIENT DECLINED

## 2024-03-27 SDOH — ECONOMIC STABILITY: FOOD INSECURITY: WITHIN THE PAST 12 MONTHS, YOU WORRIED THAT YOUR FOOD WOULD RUN OUT BEFORE YOU GOT MONEY TO BUY MORE.: OFTEN TRUE

## 2024-03-27 NOTE — PROGRESS NOTES
Chronic Disease Follow up    Curtis Higuera (: 1940) is a 83 y.o. male here for evaluation of the following chief concerns(s):  New Patient (Here to establish care - former [place] patient)       ASSESSMENT/PLAN:  1. Coronary artery disease involving native heart without angina pectoris, unspecified vessel or lesion type  -     Lipid Panel; Future  -     Comprehensive Metabolic Panel; Future  -     CBC with Auto Differential; Future  2. Prediabetes  -     Hemoglobin A1C; Future  3. Skin lesion of back  -     External Referral To Dermatology    Orders Placed This Encounter   Procedures    Lipid Panel     Standing Status:   Future     Standing Expiration Date:   3/27/2025    Comprehensive Metabolic Panel     Standing Status:   Future     Standing Expiration Date:   3/27/2025    CBC with Auto Differential     Standing Status:   Future     Standing Expiration Date:   3/27/2025    Hemoglobin A1C     Standing Status:   Future     Standing Expiration Date:   3/27/2025    External Referral To Dermatology     Referral Priority:   Routine     Referral Type:   Eval and Treat     Referral Reason:   Specialty Services Required     Requested Specialty:   Dermatology     Number of Visits Requested:   1     CAD, HTN- stable. Following with cardiology. Check labs per above. Continue DAPT with ASA/Plavix, Statin, BB.     Prediabetes- stable. Diet controlled. Recheck A1c     Skin lesion on back- suspicious for cancer, there is a necrotic area in the center. Too large to remove in the office and patient is on DAPT--- will refer to dermatology.     Return in about 1 year (around 3/27/2025).    Patient agrees with plan as above and has no additional questions at this time.     SUBJECTIVE/OBJECTIVE:    Patient presents for follow up chronic disease     CAD, HTN  [facility]- Dr. Courtney  H/O several PCI dating back to . Last stents placed in 2016   On ASA, Plavix, BB, Statin   Echo 2023- EF 51%, grade 1

## 2024-03-27 NOTE — PROGRESS NOTES
Curtis Higuera presents today for   Chief Complaint   Patient presents with    New Patient     Here to establish care - former [place] patient       Is someone accompanying this pt? no    Is the patient using any DME equipment during OV? no    Health Maintenance reviewed and discussed and ordered per Provider.    Health Maintenance Due   Topic Date Due    Pneumococcal 65+ years Vaccine (1 of 2 - PCV) Never done    DTaP/Tdap/Td vaccine (1 - Tdap) Never done    Shingles vaccine (1 of 2) Never done    Respiratory Syncytial Virus (RSV) Pregnant or age 60 yrs+ (1 - 1-dose 60+ series) Never done    Lipids  12/29/2022    Flu vaccine (1) Never done    COVID-19 Vaccine (3 - 2023-24 season) 09/01/2023    Annual Wellness Visit (Medicare Advantage)  Never done   .      \"Have you been to the ER, urgent care clinic since your last visit?  Hospitalized since your last visit?\"    NO    “Have you seen or consulted any other health care providers outside of [facility] since your last visit?”    NO

## 2024-04-09 ENCOUNTER — TELEPHONE (OUTPATIENT)
Age: 84
End: 2024-04-09

## 2024-04-09 NOTE — TELEPHONE ENCOUNTER
Faxed request for cardiac clearance from [facility] fro dental work no date.     Verbal order and read back per Redd Courtney MD  Low risk for procedure   Can hold plavix 5-7 days prior      Faxed form back to provider

## 2024-09-25 ENCOUNTER — OFFICE VISIT (OUTPATIENT)
Age: 84
End: 2024-09-25
Payer: MEDICARE

## 2024-09-25 VITALS
DIASTOLIC BLOOD PRESSURE: 72 MMHG | OXYGEN SATURATION: 100 % | WEIGHT: 159 LBS | HEART RATE: 73 BPM | HEIGHT: 68 IN | SYSTOLIC BLOOD PRESSURE: 138 MMHG | BODY MASS INDEX: 24.1 KG/M2

## 2024-09-25 DIAGNOSIS — I25.10 ATHEROSCLEROSIS OF NATIVE CORONARY ARTERY OF NATIVE HEART WITHOUT ANGINA PECTORIS: Primary | ICD-10-CM

## 2024-09-25 DIAGNOSIS — R07.9 CHEST PAIN, UNSPECIFIED TYPE: ICD-10-CM

## 2024-09-25 DIAGNOSIS — I63.449 CEREBRAL INFARCTION DUE TO EMBOLISM OF CEREBELLAR ARTERY, UNSPECIFIED BLOOD VESSEL LATERALITY (HCC): ICD-10-CM

## 2024-09-25 DIAGNOSIS — R00.2 PALPITATIONS: ICD-10-CM

## 2024-09-25 PROCEDURE — 3075F SYST BP GE 130 - 139MM HG: CPT | Performed by: INTERNAL MEDICINE

## 2024-09-25 PROCEDURE — 99214 OFFICE O/P EST MOD 30 MIN: CPT | Performed by: INTERNAL MEDICINE

## 2024-09-25 PROCEDURE — 93000 ELECTROCARDIOGRAM COMPLETE: CPT | Performed by: INTERNAL MEDICINE

## 2024-09-25 PROCEDURE — 1123F ACP DISCUSS/DSCN MKR DOCD: CPT | Performed by: INTERNAL MEDICINE

## 2024-09-25 PROCEDURE — 3078F DIAST BP <80 MM HG: CPT | Performed by: INTERNAL MEDICINE

## 2024-09-25 RX ORDER — CLOPIDOGREL BISULFATE 75 MG/1
75 TABLET ORAL DAILY
Qty: 90 TABLET | Refills: 3 | Status: SHIPPED | OUTPATIENT
Start: 2024-09-25

## 2024-09-25 RX ORDER — ROSUVASTATIN CALCIUM 10 MG/1
10 TABLET, COATED ORAL NIGHTLY
Qty: 90 TABLET | Refills: 3 | Status: SHIPPED | OUTPATIENT
Start: 2024-09-25

## 2024-09-25 RX ORDER — METOPROLOL SUCCINATE 25 MG/1
25 TABLET, EXTENDED RELEASE ORAL DAILY
Qty: 90 TABLET | Refills: 3 | Status: SHIPPED | OUTPATIENT
Start: 2024-09-25

## 2024-09-25 ASSESSMENT — ENCOUNTER SYMPTOMS: SHORTNESS OF BREATH: 1

## 2024-09-25 ASSESSMENT — PATIENT HEALTH QUESTIONNAIRE - PHQ9
1. LITTLE INTEREST OR PLEASURE IN DOING THINGS: NOT AT ALL
SUM OF ALL RESPONSES TO PHQ QUESTIONS 1-9: 0
SUM OF ALL RESPONSES TO PHQ9 QUESTIONS 1 & 2: 0
2. FEELING DOWN, DEPRESSED OR HOPELESS: NOT AT ALL
SUM OF ALL RESPONSES TO PHQ QUESTIONS 1-9: 0

## 2024-10-03 ENCOUNTER — TELEPHONE (OUTPATIENT)
Facility: CLINIC | Age: 84
End: 2024-10-03

## 2024-10-03 NOTE — TELEPHONE ENCOUNTER
Reach out this patient via phone to assist him in scheduling a  Annual wellness message  was left on voice mail

## 2025-03-07 ENCOUNTER — OFFICE VISIT (OUTPATIENT)
Age: 85
End: 2025-03-07
Payer: MEDICARE

## 2025-03-07 VITALS
HEIGHT: 68 IN | SYSTOLIC BLOOD PRESSURE: 124 MMHG | HEART RATE: 72 BPM | OXYGEN SATURATION: 98 % | DIASTOLIC BLOOD PRESSURE: 68 MMHG | BODY MASS INDEX: 25.01 KG/M2 | WEIGHT: 165 LBS

## 2025-03-07 DIAGNOSIS — I25.10 ATHEROSCLEROSIS OF NATIVE CORONARY ARTERY OF NATIVE HEART WITHOUT ANGINA PECTORIS: Primary | ICD-10-CM

## 2025-03-07 DIAGNOSIS — R00.2 PALPITATIONS: ICD-10-CM

## 2025-03-07 DIAGNOSIS — I63.449 CEREBRAL INFARCTION DUE TO EMBOLISM OF CEREBELLAR ARTERY, UNSPECIFIED BLOOD VESSEL LATERALITY (HCC): ICD-10-CM

## 2025-03-07 DIAGNOSIS — R07.9 CHEST PAIN, UNSPECIFIED TYPE: ICD-10-CM

## 2025-03-07 PROCEDURE — 3074F SYST BP LT 130 MM HG: CPT | Performed by: INTERNAL MEDICINE

## 2025-03-07 PROCEDURE — 1123F ACP DISCUSS/DSCN MKR DOCD: CPT | Performed by: INTERNAL MEDICINE

## 2025-03-07 PROCEDURE — 1159F MED LIST DOCD IN RCRD: CPT | Performed by: INTERNAL MEDICINE

## 2025-03-07 PROCEDURE — 93000 ELECTROCARDIOGRAM COMPLETE: CPT | Performed by: INTERNAL MEDICINE

## 2025-03-07 PROCEDURE — 99214 OFFICE O/P EST MOD 30 MIN: CPT | Performed by: INTERNAL MEDICINE

## 2025-03-07 PROCEDURE — 1160F RVW MEDS BY RX/DR IN RCRD: CPT | Performed by: INTERNAL MEDICINE

## 2025-03-07 PROCEDURE — 3078F DIAST BP <80 MM HG: CPT | Performed by: INTERNAL MEDICINE

## 2025-03-07 PROCEDURE — 1126F AMNT PAIN NOTED NONE PRSNT: CPT | Performed by: INTERNAL MEDICINE

## 2025-03-07 ASSESSMENT — PATIENT HEALTH QUESTIONNAIRE - PHQ9
SUM OF ALL RESPONSES TO PHQ QUESTIONS 1-9: 0
SUM OF ALL RESPONSES TO PHQ QUESTIONS 1-9: 0
2. FEELING DOWN, DEPRESSED OR HOPELESS: NOT AT ALL
SUM OF ALL RESPONSES TO PHQ QUESTIONS 1-9: 0
SUM OF ALL RESPONSES TO PHQ QUESTIONS 1-9: 0
1. LITTLE INTEREST OR PLEASURE IN DOING THINGS: NOT AT ALL

## 2025-03-07 ASSESSMENT — ANXIETY QUESTIONNAIRES
6. BECOMING EASILY ANNOYED OR IRRITABLE: NOT AT ALL
4. TROUBLE RELAXING: NOT AT ALL
1. FEELING NERVOUS, ANXIOUS, OR ON EDGE: NOT AT ALL
5. BEING SO RESTLESS THAT IT IS HARD TO SIT STILL: NOT AT ALL
2. NOT BEING ABLE TO STOP OR CONTROL WORRYING: NOT AT ALL
IF YOU CHECKED OFF ANY PROBLEMS ON THIS QUESTIONNAIRE, HOW DIFFICULT HAVE THESE PROBLEMS MADE IT FOR YOU TO DO YOUR WORK, TAKE CARE OF THINGS AT HOME, OR GET ALONG WITH OTHER PEOPLE: NOT DIFFICULT AT ALL
3. WORRYING TOO MUCH ABOUT DIFFERENT THINGS: NOT AT ALL
7. FEELING AFRAID AS IF SOMETHING AWFUL MIGHT HAPPEN: NOT AT ALL
GAD7 TOTAL SCORE: 0

## 2025-03-07 NOTE — PROGRESS NOTES
HISTORY OF PRESENT ILLNESS  Curtis Higuera  84 y.o. male     Chief Complaint   Patient presents with    Follow-up     6 month follow up       ASSESSMENT and PLAN    The primary encounter diagnosis was Atherosclerosis of native coronary artery of native heart without angina pectoris. 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    Mr. Curtis Higuera has history of CAD.  Around 2005, he had 4 stents placed at [facility].  In December 2016, he presented to [facility] with increased exertional chest pains for several months.  He was diagnosed with ACS.  He subsequently underwent angioplasty and stent of proximal RCA 90% ISR residual 0% using LAYNE.  He also had focal LAD 90% ISR stented to 0%.  He has SÁNCHEZ.  He does have a CPAP machine.  Between 2019 and 2021, he did not wear CPAP machine.  He had been on Brilinta.  However, this was switched to Plavix as he has had frequent epistaxis.  He has also noticed some blood on the toilet paper.  I will defer further evaluation of rectal blood to the PCP.   He self discontinued Plavix February 2021.  He presented to the emergency room with chest heaviness in November 2020.  He was evaluated and discharged home.  His echocardiogram done July 2021 revealed EF 55-60%.  His nuclear scan revealed probably normal finding with EF 64%.  He had small fixed inferior defect which was felt to be diaphragmatic attenuation.  Continued medical therapy was recommended.   His chronic dizziness is gradually worsening.  There is a strong component of orthostatic hypotension/dizziness.  His Toprol-XL 25 mg daily has been held for over a year around 2019; however, his dizzy spells never improved.  His event monitor in October 2023 was only worn for 2 minutes.  It appears that there may have been malfunction and usage

## 2025-03-07 NOTE — PROGRESS NOTES
Curtis Higuera presents today for   Chief Complaint   Patient presents with    Follow-up     6 month follow up       Curtis Higuera preferred language for health care discussion is english/other.    Is someone accompanying this pt? no    Is the patient using any DME equipment during OV? no    Depression Screening:  Depression: Not at risk (9/25/2024)    PHQ-2     PHQ-2 Score: 0        Learning Assessment:  No question data found.       Pt currently taking Anticoagulant therapy? no    Pt currently taking Antiplatelet therapy ? Aspirin 81 mg daily, plavix 75mg daily      Coordination of Care:  1. Have you been to the ER, urgent care clinic since your last visit? Hospitalized since your last visit? no    2. Have you seen or consulted any other health care providers outside of the [facility] since your last visit? Include any pap smears or colon screening. no